# Patient Record
Sex: FEMALE | Race: BLACK OR AFRICAN AMERICAN | NOT HISPANIC OR LATINO | Employment: FULL TIME | URBAN - METROPOLITAN AREA
[De-identification: names, ages, dates, MRNs, and addresses within clinical notes are randomized per-mention and may not be internally consistent; named-entity substitution may affect disease eponyms.]

---
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
## 2021-03-04 ENCOUNTER — HOSPITAL ENCOUNTER (EMERGENCY)
Facility: HOSPITAL | Age: 21
Discharge: HOME | End: 2021-03-04
Attending: EMERGENCY MEDICINE | Admitting: EMERGENCY MEDICINE
Payer: COMMERCIAL

## 2021-03-04 VITALS
SYSTOLIC BLOOD PRESSURE: 107 MMHG | HEART RATE: 67 BPM | WEIGHT: 121.03 LBS | OXYGEN SATURATION: 100 % | DIASTOLIC BLOOD PRESSURE: 68 MMHG | TEMPERATURE: 98.8 F | RESPIRATION RATE: 16 BRPM

## 2021-03-04 DIAGNOSIS — O21.9 NAUSEA AND VOMITING IN PREGNANCY PRIOR TO 22 WEEKS GESTATION: Primary | ICD-10-CM

## 2021-03-04 DIAGNOSIS — E86.0 DEHYDRATION: ICD-10-CM

## 2021-03-04 LAB
BACTERIA UR QL AUTO: ABNORMAL /HPF
BILIRUB UR QL STRIP: ABNORMAL
CLARITY UR: CLEAR
COLOR UR: YELLOW
COLOR, POC: NORMAL
EXT PREG TEST URINE: POSITIVE
EXT. CONTROL ED NAV: ABNORMAL
GLUCOSE UR STRIP-MCNC: NEGATIVE MG/DL
HGB UR QL STRIP.AUTO: NEGATIVE
KETONES UR STRIP-MCNC: ABNORMAL MG/DL
LEUKOCYTE ESTERASE UR QL STRIP: ABNORMAL
MUCOUS THREADS UR QL AUTO: ABNORMAL
NITRITE UR QL STRIP: NEGATIVE
NON-SQ EPI CELLS URNS QL MICRO: ABNORMAL /HPF
OTHER STN SPEC: ABNORMAL
PH UR STRIP.AUTO: 6 [PH] (ref 4.5–8)
PROT UR STRIP-MCNC: ABNORMAL MG/DL
RBC #/AREA URNS AUTO: ABNORMAL /HPF
SP GR UR STRIP.AUTO: >=1.03 (ref 1–1.03)
UROBILINOGEN UR QL STRIP.AUTO: 1 E.U./DL
WBC #/AREA URNS AUTO: ABNORMAL /HPF

## 2021-03-04 PROCEDURE — 99284 EMERGENCY DEPT VISIT MOD MDM: CPT | Performed by: EMERGENCY MEDICINE

## 2021-03-04 PROCEDURE — 81001 URINALYSIS AUTO W/SCOPE: CPT

## 2021-03-04 PROCEDURE — 96374 THER/PROPH/DIAG INJ IV PUSH: CPT

## 2021-03-04 PROCEDURE — 96361 HYDRATE IV INFUSION ADD-ON: CPT

## 2021-03-04 PROCEDURE — 99284 EMERGENCY DEPT VISIT MOD MDM: CPT

## 2021-03-04 PROCEDURE — 96375 TX/PRO/DX INJ NEW DRUG ADDON: CPT

## 2021-03-04 PROCEDURE — 81025 URINE PREGNANCY TEST: CPT | Performed by: EMERGENCY MEDICINE

## 2021-03-04 RX ORDER — METOCLOPRAMIDE HYDROCHLORIDE 5 MG/ML
10 INJECTION INTRAMUSCULAR; INTRAVENOUS ONCE
Status: COMPLETED | OUTPATIENT
Start: 2021-03-04 | End: 2021-03-04

## 2021-03-04 RX ORDER — METOCLOPRAMIDE 10 MG/1
10 TABLET ORAL EVERY 6 HOURS PRN
Qty: 30 TABLET | Refills: 0 | Status: SHIPPED | OUTPATIENT
Start: 2021-03-04 | End: 2021-09-22

## 2021-03-04 RX ORDER — DIPHENHYDRAMINE HYDROCHLORIDE 50 MG/ML
25 INJECTION INTRAMUSCULAR; INTRAVENOUS ONCE
Status: COMPLETED | OUTPATIENT
Start: 2021-03-04 | End: 2021-03-04

## 2021-03-04 RX ADMIN — METOCLOPRAMIDE 10 MG: 5 INJECTION, SOLUTION INTRAMUSCULAR; INTRAVENOUS at 15:52

## 2021-03-04 RX ADMIN — SODIUM CHLORIDE 2000 ML: 0.9 INJECTION, SOLUTION INTRAVENOUS at 15:53

## 2021-03-04 RX ADMIN — DIPHENHYDRAMINE HYDROCHLORIDE 25 MG: 50 INJECTION, SOLUTION INTRAMUSCULAR; INTRAVENOUS at 15:52

## 2021-03-04 NOTE — Clinical Note
Som Stevens was seen and treated in our emergency department on 3/4/2021  Diagnosis:     Anne Marie Uriostegui  may return to work on return date  She may return on this date: 03/06/2021         If you have any questions or concerns, please don't hesitate to call        Navid Camara MD    ______________________________           _______________          _______________  Hospital Representative                              Date                                Time

## 2021-03-04 NOTE — ED PROVIDER NOTES
History  Chief Complaint   Patient presents with    Morning Sickness     Pt reports nausea and vomiting x 2 days  Pt reports positive home pregancy test 3 days ago  Only tolerating water   F LNMP end of january presents for evaluation of nausea and vomiting x 2 days  Pt complains of gradual onset of nausea yesterday which is constant unchanged and worse with eating/drinking   +multiple episodes of nbnb vomitus  No f/c, abd pain, back/flank pain, vaginal complaints, anorexia      History provided by:  Patient      None       History reviewed  No pertinent past medical history  Past Surgical History:   Procedure Laterality Date    NO PAST SURGERIES         History reviewed  No pertinent family history  I have reviewed and agree with the history as documented  E-Cigarette/Vaping    E-Cigarette Use Never User      E-Cigarette/Vaping Substances     Social History     Tobacco Use    Smoking status: Never Smoker    Smokeless tobacco: Never Used   Substance Use Topics    Alcohol use: No    Drug use: Yes     Types: Marijuana     Comment: Not since finding out she was pregnant       Review of Systems   Constitutional: Negative for appetite change, diaphoresis, fatigue and fever  HENT: Negative for congestion, dental problem and rhinorrhea  Eyes: Negative for pain  Respiratory: Negative for chest tightness and shortness of breath  Cardiovascular: Negative for chest pain and leg swelling  Gastrointestinal: Positive for nausea and vomiting  Negative for abdominal pain, blood in stool, constipation and diarrhea  Endocrine: Negative for polydipsia, polyphagia and polyuria  Genitourinary: Negative for decreased urine volume, difficulty urinating, dyspareunia, dysuria, enuresis, flank pain, frequency, hematuria, pelvic pain, vaginal bleeding, vaginal discharge and vaginal pain  Musculoskeletal: Negative for arthralgias, back pain and myalgias  Skin: Negative for color change and rash  Neurological: Negative for dizziness, weakness, light-headedness and headaches  Psychiatric/Behavioral: Negative for hallucinations and suicidal ideas  Physical Exam  Physical Exam  Constitutional:       Appearance: She is well-developed  HENT:      Head: Normocephalic and atraumatic  Eyes:      Pupils: Pupils are equal, round, and reactive to light  Neck:      Vascular: No JVD  Trachea: No tracheal deviation  Cardiovascular:      Rate and Rhythm: Normal rate and regular rhythm  Pulmonary:      Effort: No tachypnea, accessory muscle usage or respiratory distress  Abdominal:      General: There is no distension  Palpations: Abdomen is soft  Tenderness: There is no abdominal tenderness  Musculoskeletal:      Right lower leg: Normal       Left lower leg: Normal    Skin:     General: Skin is warm  Capillary Refill: Capillary refill takes less than 2 seconds  Neurological:      Mental Status: She is alert and oriented to person, place, and time     Psychiatric:         Behavior: Behavior normal          Vital Signs  ED Triage Vitals   Temperature Pulse Respirations Blood Pressure SpO2   03/04/21 1439 03/04/21 1436 03/04/21 1436 03/04/21 1439 03/04/21 1436   98 8 °F (37 1 °C) 64 16 117/77 100 %      Temp src Heart Rate Source Patient Position - Orthostatic VS BP Location FiO2 (%)   -- 03/04/21 1436 03/04/21 1436 03/04/21 1436 --    Monitor Sitting Right arm       Pain Score       --                  Vitals:    03/04/21 1436 03/04/21 1439 03/04/21 1640   BP:  117/77 107/68   Pulse: 64  67   Patient Position - Orthostatic VS: Sitting  Lying         Visual Acuity      ED Medications  Medications   sodium chloride 0 9 % bolus 2,000 mL (2,000 mL Intravenous New Bag 3/4/21 1553)   metoclopramide (REGLAN) injection 10 mg (10 mg Intravenous Given 3/4/21 1552)   diphenhydrAMINE (BENADRYL) injection 25 mg (25 mg Intravenous Given 3/4/21 1552)       Diagnostic Studies  Results Reviewed Procedure Component Value Units Date/Time    Urine Microscopic [[id number]]  (Abnormal) Collected: 03/04/21 1529    Lab Status: Final result Specimen: Urine, Clean Catch Updated: 03/04/21 1556     RBC, UA 0-1 /hpf      WBC, UA 2-4 /hpf      Epithelial Cells Occasional /hpf      Bacteria, UA Innumerable /hpf      OTHER OBSERVATIONS Trichomonas Organisms Present     MUCUS THREADS Innumerable    POCT urinalysis dipstick [[id number]]  (Normal) Resulted: 03/04/21 1532    Lab Status: Final result Specimen: Urine Updated: 03/04/21 1532     Color, UA -    Urine Macroscopic, POC [[id number]]  (Abnormal) Collected: 03/04/21 1529    Lab Status: Final result Specimen: Urine Updated: 03/04/21 1531     Color, UA Yellow     Clarity, UA Clear     pH, UA 6 0     Leukocytes, UA Trace     Nitrite, UA Negative     Protein, UA 30 (1+) mg/dl      Glucose, UA Negative mg/dl      Ketones, UA 80 (3+) mg/dl      Urobilinogen, UA 1 0 E U /dl      Bilirubin, UA Interference- unable to analyze     Blood, UA Negative     Specific Gravity, UA >=1 030    Narrative:      CLINITEK RESULT    POCT pregnancy, urine [[id number]]  (Abnormal) Resulted: 03/04/21 1529    Lab Status: Final result Updated: 03/04/21 1529     EXT PREG TEST UR (Ref: Negative) positive     Control valid                 No orders to display              Procedures  CriticalCare Time  Performed by: Charlyne Ahumada, MD  Authorized by: Charlyne Ahumada, MD     Critical care provider statement:     Critical care time (minutes):  35    Critical care time was exclusive of:  Separately billable procedures and treating other patients and teaching time    Critical care was necessary to treat or prevent imminent or life-threatening deterioration of the following conditions:  Dehydration    Critical care was time spent personally by me on the following activities:  Blood draw for specimens, obtaining history from patient or surrogate, development of treatment plan with patient or surrogate, discussions with consultants, evaluation of patient's response to treatment, examination of patient, interpretation of cardiac output measurements, ordering and performing treatments and interventions, ordering and review of laboratory studies, ordering and review of radiographic studies, re-evaluation of patient's condition and review of old charts             ED Course  ED Course as of Mar 04 1646   Thu Mar 04, 2021   1642 Symptoms resolved, will dc to home w/ ob f/u                                              MDM  Number of Diagnoses or Management Options  Dehydration:   Nausea and vomiting in pregnancy prior to 22 weeks gestation:   Diagnosis management comments: N/v in pregnancy with benign exam-will do urine dip, upreg, ivfs, anti-emetics, po challenge    Ivf's given for sever dehydration      Disposition  Final diagnoses:   Nausea and vomiting in pregnancy prior to 22 weeks gestation   Dehydration     Time reflects when diagnosis was documented in both MDM as applicable and the Disposition within this note     Time User Action Codes Description Comment    3/4/2021  4:44 PM Jenniffer Lynn Add [O21 9] Nausea and vomiting in pregnancy prior to 22 weeks gestation     3/4/2021  4:44 PM Hayden Baeza Add [E86 0] Dehydration       ED Disposition     ED Disposition Condition Date/Time Comment    Discharge Stable Thu Mar 4, 2021  4:44 PM Rasta Shearer discharge to home/self care              Follow-up Information     Follow up With Specialties Details Why Contact Info Additional [address] [facility] and Gynecology Schedule an appointment as soon as possible for a visit in 2 days  [address], [address] [facility] [address]  [facility], [address], [address] [place], [place], [address] [phone]          Patient's Medications   Discharge Prescriptions    METOCLOPRAMIDE (REGLAN) 10 MG TABLET    Take 1 tablet (10 mg total) by mouth every 6 (six) hours as needed (nausea and/or vomiting)       Start Date: 3/4/2021  End Date: --       Order Dose: 10 mg       Quantity: 30 tablet    Refills: 0     No discharge procedures on file      PDMP Review     None          ED Provider  Electronically Signed by           Lisa Gusman MD  03/04/21 9613

## 2021-03-20 ENCOUNTER — HOSPITAL ENCOUNTER (EMERGENCY)
Facility: HOSPITAL | Age: 21
Discharge: HOME | End: 2021-03-20
Attending: EMERGENCY MEDICINE | Admitting: EMERGENCY MEDICINE
Payer: COMMERCIAL

## 2021-03-20 VITALS
WEIGHT: 123.46 LBS | SYSTOLIC BLOOD PRESSURE: 116 MMHG | HEART RATE: 73 BPM | OXYGEN SATURATION: 100 % | RESPIRATION RATE: 14 BRPM | TEMPERATURE: 98.3 F | DIASTOLIC BLOOD PRESSURE: 73 MMHG

## 2021-03-20 DIAGNOSIS — N39.0 UTI (URINARY TRACT INFECTION): Primary | ICD-10-CM

## 2021-03-20 LAB
BACTERIA UR QL AUTO: ABNORMAL /HPF
BILIRUB UR QL STRIP: NEGATIVE
CLARITY UR: CLEAR
COLOR UR: YELLOW
COLOR, POC: YELLOW
EXT PREG TEST URINE: ABNORMAL
EXT. CONTROL ED NAV: ABNORMAL
GLUCOSE UR STRIP-MCNC: NEGATIVE MG/DL
HGB UR QL STRIP.AUTO: ABNORMAL
KETONES UR STRIP-MCNC: NEGATIVE MG/DL
LEUKOCYTE ESTERASE UR QL STRIP: ABNORMAL
NITRITE UR QL STRIP: NEGATIVE
NON-SQ EPI CELLS URNS QL MICRO: ABNORMAL /HPF
PH UR STRIP.AUTO: 5.5 [PH] (ref 4.5–8)
PROT UR STRIP-MCNC: ABNORMAL MG/DL
RBC #/AREA URNS AUTO: ABNORMAL /HPF
SP GR UR STRIP.AUTO: >=1.03 (ref 1–1.03)
UROBILINOGEN UR QL STRIP.AUTO: 0.2 E.U./DL
WBC #/AREA URNS AUTO: ABNORMAL /HPF

## 2021-03-20 PROCEDURE — 81001 URINALYSIS AUTO W/SCOPE: CPT

## 2021-03-20 PROCEDURE — 87491 CHLMYD TRACH DNA AMP PROBE: CPT | Performed by: EMERGENCY MEDICINE

## 2021-03-20 PROCEDURE — 87591 N.GONORRHOEAE DNA AMP PROB: CPT | Performed by: EMERGENCY MEDICINE

## 2021-03-20 PROCEDURE — 81025 URINE PREGNANCY TEST: CPT | Performed by: EMERGENCY MEDICINE

## 2021-03-20 PROCEDURE — 87086 URINE CULTURE/COLONY COUNT: CPT

## 2021-03-20 PROCEDURE — 87077 CULTURE AEROBIC IDENTIFY: CPT

## 2021-03-20 PROCEDURE — 99284 EMERGENCY DEPT VISIT MOD MDM: CPT | Performed by: EMERGENCY MEDICINE

## 2021-03-20 PROCEDURE — 99283 EMERGENCY DEPT VISIT LOW MDM: CPT

## 2021-03-20 RX ORDER — NITROFURANTOIN 25; 75 MG/1; MG/1
100 CAPSULE ORAL 2 TIMES DAILY
Qty: 10 CAPSULE | Refills: 0 | Status: SHIPPED | OUTPATIENT
Start: 2021-03-20 | End: 2021-03-20 | Stop reason: SDUPTHER

## 2021-03-20 RX ORDER — NITROFURANTOIN 25; 75 MG/1; MG/1
100 CAPSULE ORAL 2 TIMES DAILY
Qty: 10 CAPSULE | Refills: 0 | Status: SHIPPED | OUTPATIENT
Start: 2021-03-20 | End: 2021-03-25

## 2021-03-20 NOTE — ED PROVIDER NOTES
History  Chief Complaint   Patient presents with    Possible UTI     hematuria, urgency and frequency       Urinary Frequency  Severity:  Mild  Onset quality:  Gradual  Duration:  4 days  Timing:  Intermittent  Progression:  Waxing and waning  Chronicity:  New  Context:  Urinary frequency/urgency  Associated symptoms: no abdominal pain, no chest pain, no cough, no fever, no nausea, no shortness of breath and no vomiting        Prior to Admission Medications   Prescriptions Last Dose Informant Patient Reported? Taking?   metoclopramide (REGLAN) 10 mg tablet   No No   Sig: Take 1 tablet (10 mg total) by mouth every 6 (six) hours as needed (nausea and/or vomiting)      Facility-Administered Medications: None       History reviewed  No pertinent past medical history  Past Surgical History:   Procedure Laterality Date    NO PAST SURGERIES         History reviewed  No pertinent family history  I have reviewed and agree with the history as documented  E-Cigarette/Vaping    E-Cigarette Use Never User      E-Cigarette/Vaping Substances     Social History     Tobacco Use    Smoking status: Never Smoker    Smokeless tobacco: Never Used   Substance Use Topics    Alcohol use: No    Drug use: Yes     Types: Marijuana     Comment: Not since finding out she was pregnant        Review of Systems   Constitutional: Negative for chills and fever  Respiratory: Negative for cough and shortness of breath  Cardiovascular: Negative for chest pain  Gastrointestinal: Negative for abdominal pain, nausea and vomiting  Genitourinary: Positive for dysuria and frequency  Negative for flank pain, vaginal bleeding and vaginal discharge  Musculoskeletal: Negative for back pain  Skin: Negative for wound  Neurological: Negative for weakness and numbness  All other systems reviewed and are negative        Physical Exam  ED Triage Vitals [03/20/21 1839]   Temperature Pulse Respirations Blood Pressure SpO2   98 3 °F (36 8 °C) 73 14 116/73 100 %      Temp Source Heart Rate Source Patient Position - Orthostatic VS BP Location FiO2 (%)   Oral Monitor Sitting Right arm --      Pain Score       --             Orthostatic Vital Signs  Vitals:    03/20/21 1839   BP: 116/73   Pulse: 73   Patient Position - Orthostatic VS: Sitting       Physical Exam  Vitals signs and nursing note reviewed  Constitutional:       General: She is not in acute distress  Appearance: She is well-developed  She is not diaphoretic  HENT:      Head: Normocephalic  Right Ear: External ear normal       Left Ear: External ear normal       Nose: Nose normal    Eyes:      Conjunctiva/sclera: Conjunctivae normal    Neck:      Musculoskeletal: Normal range of motion  Trachea: No tracheal deviation  Cardiovascular:      Rate and Rhythm: Normal rate  Pulmonary:      Effort: Pulmonary effort is normal  No respiratory distress  Abdominal:      General: There is no distension  Palpations: Abdomen is soft  Tenderness: There is no abdominal tenderness  There is no right CVA tenderness, left CVA tenderness or guarding  Musculoskeletal:         General: No deformity  Skin:     General: Skin is warm and dry  Neurological:      General: No focal deficit present  Mental Status: She is alert     Psychiatric:         Behavior: Behavior normal          ED Medications  Medications - No data to display    Diagnostic Studies  Results Reviewed     Procedure Component Value Units Date/Time    Urine Microscopic [[id number]]  (Abnormal) Collected: 03/20/21 1925    Lab Status: Final result Specimen: Urine, Clean Catch Updated: 03/20/21 1945     RBC, UA Innumerable /hpf      WBC, UA Innumerable /hpf      Epithelial Cells Occasional /hpf      Bacteria, UA Innumerable /hpf     POCT urinalysis dipstick [[id number]]  (Normal) Resulted: 03/20/21 1932    Lab Status: Final result Specimen: Urine Updated: 03/20/21 1932     Color, UA yellow    POCT pregnancy, urine [[id number]]  (Abnormal) Resulted: 03/20/21 1932    Lab Status: Final result Updated: 03/20/21 1932     EXT PREG TEST UR (Ref: Negative) Positive (+)     Control Valid    Chlamydia/GC amplified DNA by PCR [[id number]] Collected: 03/20/21 1927    Lab Status: In process Specimen: Urine, Other Updated: 03/20/21 1930    Urine culture [[id number]] Collected: 03/20/21 1925    Lab Status: In process Specimen: Urine, Clean Catch Updated: 03/20/21 1930    Urine Macroscopic, POC [[id number]]  (Abnormal) Collected: 03/20/21 1925    Lab Status: Final result Specimen: Urine Updated: 03/20/21 1926     Color, UA Yellow     Clarity, UA Clear     pH, UA 5 5     Leukocytes, UA Small     Nitrite, UA Negative     Protein,  (2+) mg/dl      Glucose, UA Negative mg/dl      Ketones, UA Negative mg/dl      Urobilinogen, UA 0 2 E U /dl      Bilirubin, UA Negative     Blood, UA Large     Specific Gravity, UA >=1 030    Narrative:      CLINITEK RESULT                 No orders to display         Procedures  Procedures      ED Course  ED Course as of Mar 20 2011   Sat Mar 20, 2021   1934 PREGNANCY TEST URINE: Positive (+)   1934 Leukocytes, UA(!): Small   1934 Blood, UA(!): Large                                       MDM  Number of Diagnoses or Management Options  Diagnosis management comments: This is a 27-year-old female who presents for evaluation of urinary frequency  Patient reports that she has been experiencing 4 days of urinary frequency and urgency and dysuria , gradual onset with no specific trigger  No fevers or chills or systemic symptoms, denies vaginal bleeding or discharge  No back pain or flank pain  Denies abdominal pain  Reports she has had a positive home pregnancy test, reports her last period was in January  She states that she has follow-up with OB this coming week to establish care  Denies other medical problems, states taking prenatals, denies taking other medications, no medication allergies    No history of kidney stones  Patient on exam is well-appearing with stable vital signs, afebrile, she has a benign abdominal exam, physical exam is otherwise unremarkable  Overall her symptoms are most consistent with likely UTI  No other symptoms to suggest other pregnancy complication  Will obtain urine dip, urine hCG, will also send a GC chlamydia screening  Plan to have patient follow-up with OB  Amount and/or Complexity of Data Reviewed  Clinical lab tests: ordered and reviewed        Disposition  Final diagnoses:   UTI (urinary tract infection)     Time reflects when diagnosis was documented in both MDM as applicable and the Disposition within this note     Time User Action Codes Description Comment    3/20/2021  7:40 PM Joycelyn aPtel Add [N39 0] UTI (urinary tract infection)       ED Disposition     ED Disposition Condition Date/Time Comment    Discharge Stable Sat Mar 20, 2021  7:40 PM Alamo Bun discharge to home/self care              Follow-up Information     Follow up With Specialties Details Why Contact Info Additional [address] [facility] Obstetrics and Gynecology Go on 3/23/2021 Please follow up with your scheduled OB appointment [address], [address] [facility] [id number] [place] [address], [address], [address] [place] [address], [address] [address] [place] [address] Emergency Department Emergency Medicine Go to  If symptoms worsen [facility] [id number]  [address] [place] [address] Emergency Department, [address] [facility] , [place], [place], [address]          Discharge Medication List as of 3/20/2021  7:42 PM      START taking these medications    Details   nitrofurantoin (MACROBID) 100 mg capsule Take 1 capsule (100 mg total) by mouth 2 (two) times a day for 5 days, Starting Sat 3/20/2021, Until Thu 3/25/2021, Normal         CONTINUE these medications which have NOT CHANGED    Details   metoclopramide (REGLAN) 10 mg tablet Take 1 tablet (10 mg total) by mouth every 6 (six) hours as needed (nausea and/or vomiting), Starting Thu 3/4/2021, Normal           No discharge procedures on file  PDMP Review     None           ED Provider  Attending physically available and evaluated Jamar Robertson  SARINA managed the patient along with the ED Attending      Electronically Signed by         Geoff Holliday MD  03/20/21 2011

## 2021-03-20 NOTE — ED ATTENDING ATTESTATION
3/20/2021  IAdina MD, saw and evaluated the patient  I have discussed the patient with the resident/non-physician practitioner and agree with the resident's/non-physician practitioner's findings, Plan of Care, and MDM as documented in the resident's/non-physician practitioner's note, except where noted  All available labs and Radiology studies were reviewed  I was present for key portions of any procedure(s) performed by the resident/non-physician practitioner and I was immediately available to provide assistance  At this point I agree with the current assessment done in the Emergency Department  I have conducted an independent evaluation of this patient a history and physical is as follows:  22 y/o F lnmp 8 weeks ago presents for evaluation of several days of dysuria, urgency, frequency and hematuria  Denies vaginal bleeding d/c, pelvic/abd/back pain, n/v/f/c   10 systems reviewed and otherwise neg  On exam no distress, lungs nml, cardiac nml, abd nml no cvat   MDM: uti symtpoms with a bening exam-will do urine dip    Pregnancy w/o hx/exam findings to suggest pregnancy complication-will reassure, , pcp f/u  ED Course  ED Course as of Mar 20 1951   Sat Mar 20, 2021   1947 WBC, UA(!): Innumerable         Critical Care Time  Procedures

## 2021-03-20 NOTE — ED NOTES
Pt provided with water to encourage urine specimen at this time        Amauri Castro RN  03/20/21 2355

## 2021-03-22 LAB
BACTERIA UR CULT: ABNORMAL
C TRACH DNA SPEC QL NAA+PROBE: POSITIVE
N GONORRHOEA DNA SPEC QL NAA+PROBE: NEGATIVE

## 2021-03-23 ENCOUNTER — DOCUMENTATION (OUTPATIENT)
Dept: OBGYN CLINIC | Facility: CLINIC | Age: 21
End: 2021-03-23

## 2021-03-23 ENCOUNTER — ULTRASOUND (OUTPATIENT)
Dept: OBGYN CLINIC | Facility: CLINIC | Age: 21
End: 2021-03-23

## 2021-03-23 ENCOUNTER — APPOINTMENT (OUTPATIENT)
Dept: LAB | Facility: CLINIC | Age: 21
End: 2021-03-23
Payer: COMMERCIAL

## 2021-03-23 VITALS — HEART RATE: 56 BPM | SYSTOLIC BLOOD PRESSURE: 114 MMHG | DIASTOLIC BLOOD PRESSURE: 74 MMHG | WEIGHT: 121 LBS

## 2021-03-23 DIAGNOSIS — Z3A.09 9 WEEKS GESTATION OF PREGNANCY: Primary | ICD-10-CM

## 2021-03-23 DIAGNOSIS — Z3A.09 9 WEEKS GESTATION OF PREGNANCY: ICD-10-CM

## 2021-03-23 LAB
ABO GROUP BLD: NORMAL
BACTERIA UR QL AUTO: ABNORMAL /HPF
BASOPHILS # BLD AUTO: 0.04 THOUSANDS/ΜL (ref 0–0.1)
BASOPHILS NFR BLD AUTO: 1 % (ref 0–1)
BILIRUB UR QL STRIP: NEGATIVE
BLD GP AB SCN SERPL QL: NEGATIVE
CLARITY UR: ABNORMAL
COLOR UR: YELLOW
EOSINOPHIL # BLD AUTO: 0.04 THOUSAND/ΜL (ref 0–0.61)
EOSINOPHIL NFR BLD AUTO: 1 % (ref 0–6)
ERYTHROCYTE [DISTWIDTH] IN BLOOD BY AUTOMATED COUNT: 12.4 % (ref 11.6–15.1)
GLUCOSE UR STRIP-MCNC: NEGATIVE MG/DL
HBV SURFACE AG SER QL: NORMAL
HCT VFR BLD AUTO: 37.6 % (ref 34.8–46.1)
HGB BLD-MCNC: 12.3 G/DL (ref 11.5–15.4)
HGB UR QL STRIP.AUTO: ABNORMAL
IMM GRANULOCYTES # BLD AUTO: 0.03 THOUSAND/UL (ref 0–0.2)
IMM GRANULOCYTES NFR BLD AUTO: 0 % (ref 0–2)
KETONES UR STRIP-MCNC: NEGATIVE MG/DL
LEUKOCYTE ESTERASE UR QL STRIP: ABNORMAL
LYMPHOCYTES # BLD AUTO: 1.68 THOUSANDS/ΜL (ref 0.6–4.47)
LYMPHOCYTES NFR BLD AUTO: 20 % (ref 14–44)
MCH RBC QN AUTO: 29.4 PG (ref 26.8–34.3)
MCHC RBC AUTO-ENTMCNC: 32.7 G/DL (ref 31.4–37.4)
MCV RBC AUTO: 90 FL (ref 82–98)
MONOCYTES # BLD AUTO: 0.6 THOUSAND/ΜL (ref 0.17–1.22)
MONOCYTES NFR BLD AUTO: 7 % (ref 4–12)
MUCOUS THREADS UR QL AUTO: ABNORMAL
NEUTROPHILS # BLD AUTO: 5.88 THOUSANDS/ΜL (ref 1.85–7.62)
NEUTS SEG NFR BLD AUTO: 71 % (ref 43–75)
NITRITE UR QL STRIP: NEGATIVE
NON-SQ EPI CELLS URNS QL MICRO: ABNORMAL /HPF
NRBC BLD AUTO-RTO: 0 /100 WBCS
PH UR STRIP.AUTO: 6.5 [PH]
PLATELET # BLD AUTO: 259 THOUSANDS/UL (ref 149–390)
PMV BLD AUTO: 12.1 FL (ref 8.9–12.7)
PROT UR STRIP-MCNC: ABNORMAL MG/DL
RBC # BLD AUTO: 4.19 MILLION/UL (ref 3.81–5.12)
RBC #/AREA URNS AUTO: ABNORMAL /HPF
RH BLD: POSITIVE
RUBV IGG SERPL IA-ACNC: 68.3 IU/ML
SP GR UR STRIP.AUTO: 1.02 (ref 1–1.03)
SPECIMEN EXPIRATION DATE: NORMAL
UROBILINOGEN UR QL STRIP.AUTO: 1 E.U./DL
WBC # BLD AUTO: 8.27 THOUSAND/UL (ref 4.31–10.16)
WBC #/AREA URNS AUTO: ABNORMAL /HPF

## 2021-03-23 PROCEDURE — 81220 CFTR GENE COM VARIANTS: CPT

## 2021-03-23 PROCEDURE — 83020 HEMOGLOBIN ELECTROPHORESIS: CPT

## 2021-03-23 PROCEDURE — 36415 COLL VENOUS BLD VENIPUNCTURE: CPT

## 2021-03-23 PROCEDURE — 81401 MOPATH PROCEDURE LEVEL 2: CPT

## 2021-03-23 PROCEDURE — 81243 FMR1 GEN ALY DETC ABNL ALLEL: CPT

## 2021-03-23 PROCEDURE — 80081 OBSTETRIC PANEL INC HIV TSTG: CPT

## 2021-03-23 PROCEDURE — 87086 URINE CULTURE/COLONY COUNT: CPT

## 2021-03-23 PROCEDURE — 81001 URINALYSIS AUTO W/SCOPE: CPT

## 2021-03-23 PROCEDURE — 82105 ALPHA-FETOPROTEIN SERUM: CPT

## 2021-03-23 PROCEDURE — 99213 OFFICE O/P EST LOW 20 MIN: CPT | Performed by: OBSTETRICS & GYNECOLOGY

## 2021-03-23 NOTE — PROGRESS NOTES
Assessment  21 y o   at approximately 9 weeks gestation presenting for amenorrhea  Pregnancy confirmed, dating consistent with LMP  COREEN 10/23/21  Final dating by LMP of 2021  Plan  Problem List Items Addressed This Visit     None      Visit Diagnoses     9 weeks gestation of pregnancy    -  Primary    Relevant Orders    Prenatal Panel    Prenatal Carrier Screen (CF, Fragile X, SMA)    Hemoglobin Electrophoresis    Alpha fetoprotein, maternal        - Prenatal vitamin  - Prenatal panel (slips given today)  - Discussed genetic screening  - Prenatal Nursing Intake in 2 weeks  - Prenatal H&P in 4 weeks     ____________________________________________________________      Subjective   Ed Gemma is a 21 y o   with an LMP of Patient's last menstrual period was 2021  (9w3d by LMP) who presents for amenorrhea  She notes she took a home pregnancy test on 3/1/2021 and it was positive  She is currently otherwise without complaint  Patient notes that this pregnancy was unplanned  She was not using contraception at the time  She reports she is certain of her LMP and that she has regular menses  She has has no vaginal bleeding since her LMP        Objective  LMP 2021     Physical Exam:  Physical Exam    Transvaginal Pelvic Ultrasound  García IUP  CRL 2 44-2 47cm, consistent with EGA 9s1d  +yolk sac  +cardiac activity    No adnexal masses appreciated

## 2021-03-24 LAB
BACTERIA UR CULT: NORMAL
HIV 1+2 AB+HIV1 P24 AG SERPL QL IA: NORMAL
RPR SER QL: NORMAL

## 2021-03-25 LAB
HGB A MFR BLD: 2.7 % (ref 1.8–3.2)
HGB A MFR BLD: 96.9 % (ref 96.4–98.8)
HGB F MFR BLD: 0.4 % (ref 0–2)
HGB FRACT BLD-IMP: NORMAL
HGB S MFR BLD: 0 %

## 2021-03-26 LAB
2ND TRIMESTER 4 SCREEN SERPL-IMP: NORMAL
AFP ADJ MOM SERPL: NORMAL
AFP INTERP AMN-IMP: NORMAL
AFP INTERP SERPL-IMP: NORMAL
AFP INTERP SERPL-IMP: NORMAL
AFP SERPL-MCNC: 4 NG/ML
AGE AT DELIVERY: 20.9 YR
GA METHOD: NORMAL
GA: 9.4 WEEKS
IDDM PATIENT QL: NO
MULTIPLE PREGNANCY: NO
NEURAL TUBE DEFECT RISK FETUS: NORMAL %

## 2021-03-29 ENCOUNTER — TELEMEDICINE (OUTPATIENT)
Dept: OBGYN CLINIC | Facility: CLINIC | Age: 21
End: 2021-03-29

## 2021-03-29 VITALS — HEIGHT: 63 IN | BODY MASS INDEX: 21.44 KG/M2 | WEIGHT: 121 LBS

## 2021-03-29 DIAGNOSIS — A74.9 CHLAMYDIA: Primary | ICD-10-CM

## 2021-03-29 DIAGNOSIS — Z3A.10 10 WEEKS GESTATION OF PREGNANCY: Primary | ICD-10-CM

## 2021-03-29 PROCEDURE — 99211 OFF/OP EST MAY X REQ PHY/QHP: CPT

## 2021-03-29 RX ORDER — AZITHROMYCIN 500 MG/1
1000 TABLET, FILM COATED ORAL ONCE
Qty: 2 TABLET | Refills: 0 | Status: SHIPPED | OUTPATIENT
Start: 2021-03-29 | End: 2021-03-29

## 2021-03-29 NOTE — PROGRESS NOTES
Virtual Regular Visit      Assessment/Plan:    Problem List Items Addressed This Visit     None      Visit Diagnoses     10 weeks gestation of pregnancy    -  Primary    Relevant Orders    Hepatitis C antibody    Hemoglobin Electrophoresis    Varicella zoster antibody, IgG    Ambulatory Referral to Maternal Fetal Medicine    Ambulatory Referral to [facility]               Reason for visit is   Chief Complaint   Patient presents with    Initial Prenatal Visit        Encounter provider Latha Costello RN    Provider located at [address] [place] [id number] [phone]      Recent Visits  No visits were found meeting these conditions  Showing recent visits within past 7 days and meeting all other requirements     Today's Visits  Date Type Provider Dept   21 Telemedicine Latha Costello, [address] today's visits and meeting all other requirements     Future Appointments  No visits were found meeting these conditions  Showing future appointments within next 150 days and meeting all other requirements        The patient was identified by name and date of birth  Emily Head was informed that this is a telemedicine visit and that the visit is being conducted through [employer] and patient was informed that this is a secure, HIPAA-compliant platform  She agrees to proceed     My office door was closed  No one else was in the room  She acknowledged consent and understanding of privacy and security of the video platform  The patient has agreed to participate and understands they can discontinue the visit at any time  Patient is aware this is a billable service  OB INTAKE INTERVIEW  Pt presents for OB intake       OB History    Para Term  AB Living   1             SAB TAB Ectopic Multiple Live Births                  # Outcome Date GA Lbr Edison/2nd Weight Sex Delivery Anes PTL Lv 1 Current                Last Menstrual Period:    Patient's last menstrual period was 01/16/2021 (exact date)  Ultrasound date: 3/23/21 9 weeks 1 days     Estimated Date of Delivery: 10/23/21    Current Issues:  Constipation :   No  Headaches :   No  Cramping:  No      Interview education   [facility] Pregnancy Essentials reviewed and discussed    Baby and Me [address] Handout   [facility] MFM Handout   Discussed genetic testing   Prenatal lab work: Scripts printed and given to pt   Influenza vaccine given today: No   Discussed Tdap vaccine  Immunizations:   Immunization History   Administered Date(s) Administered    DTaP 5 01/17/2001, 04/03/2001, 06/07/2002, 11/19/2002, 02/24/2005    HPV Quadrivalent 10/02/2002, 12/05/2011, 07/31/2014    Hep B, adult 01/17/2001, 04/13/2001, 11/19/2002    Hib (PRP-OMP) 01/17/2001, 04/13/2001, 06/07/2002, 11/19/2002    IPV 01/17/2001, 04/03/2001, 06/07/2002, 02/24/2005    MMR 06/07/2002, 02/24/2005    Meningococcal, Unknown Serogroups 12/05/2011, 02/02/2017     Depression Screening Follow-up Plan: Patient's depression screening was negative with an Burnett score of  0      Prenatal panel already completed  Scripts given for Hep c, hemoglobin electrophoresis and Varicella  Referral placed for Level 2 U/S, scheduled for 6/9/21 at [address] [place] [address] at the [facility]  Patient in the process of getting insurance set up  She is going to call today to verify the status  She would like to hold off on the Sequential Screen for now   I reviewed the Quad Screen with her and she is interested in that testing when her insurance is active  Referral placed for [facility]      Interviewed by: Radha Baez RN 03/29/21        I spent 50 minutes with patient today in which greater than 50% of the time was spent in counseling/coordination of care regarding her pregnancy      VIRTUAL VISIT DISCLAIMER    Jarrod Yun acknowledges that she has consented to an online visit or consultation  She understands that the online visit is based solely on information provided by her, and that, in the absence of a face-to-face physical evaluation by the physician, the diagnosis she receives is both limited and provisional in terms of accuracy and completeness  This is not intended to replace a full medical face-to-face evaluation by the physician  Callie Whyte understands and accepts these terms

## 2021-03-29 NOTE — PATIENT INSTRUCTIONS
The First Trimester  (up through 14 weeks)   YOUR BABY   · Your baby starts to develop when your egg and a sperm come together  · Your baby grows inside your uterus (also called your womb)  It floats inside a bag of water - called the amniotic sac  The baby is connected to you by an umbilical cord which goes from the babys belly to the placenta  The placenta is attached to your uterus, and the placenta is where blood, oxygen, and food cross over from you to your baby  · Unhealthy things like drugs, alcohol, and tobacco can also cross over from you to your baby through the placenta  It is important to avoid these things as they can be harmful to how your baby develops and grows  · By the end of the first month, your babys heart is beating  The baby is about the size of a piece of rice  · By the end of the second month, all of you babys organ (heart, lungs, brain, skull) have completely formed  Now they just have to continue maturing and growing  The baby is about the size of a grape  · By the end of the third month, your baby is about 4 inches long! YOUR BODY   · Your period stops - this might be the first sign that you have that you are pregnant   · Your breasts may become sore and tender  · You may feel very tired  · You may have an upset stomach with nausea and/or vomiting which can occur at any time of day - or sometimes all day long  · You may feel lujan and cranky  You may also feel afraid or happy  This is all normal and natural!   · You may lose or gain weight  This is okay as well, as long as you are not losing or gaining a lot of weight          The Second Trimester (14-28 weeks)   YOUR BABY   * 4th month   · your baby has eyelashes and eyebrows   · your baby kicks, moves, and swallows   · your baby is 6 to 7 inches long   * 5th month   · your baby has fingernails   · your baby starts to having sleeping and awake cycles   · baby becomes very active (even though you may not always feel it)   · your baby is 8 to 12 inches long and weighs anywhere from ½ to 1 pound   * 6th month   · your babys eyes are almost completely formed and they will soon open and close   · babys skin is red and wrinkly and covered with a fine, soft hair called lanugo   · baby continues to be very active and you should be feeling it very well and very often   · your baby is 11 to 14 inches long     YOUR BODY   · Morning sickness usually starts to go away and women generally start to gaining weight more quickly  · You may start to get stretch marks on your belly and/or breasts which can be itchy  Softly rub lotion onto them to help relieve the itching  · Your vaginal discharge may become whitish in color  · You may become constipated  Try increasing fiber in your diet, or you may take a stool softener pill (such as Colace) to relieve the discomfort  · You may start to have heartburn  Medications like Tums or Maalox may help to relieve this  Try staying in a sitting position for at least an hour after meals to decrease heartburn  · You should try to get 8 hours of sleep at night, plus a nap during the day if possible  · You should not sit for longer than 2 hours without taking a break to stretch your legs  The Third Trimester  (28-42 weeks)  YOUR BABY   · your baby sucks its thumb now! · your baby can hear voices and respond to touch   so talk to him or her!!   · your babys brain grows and develops most in the last 2 months of pregnancy   · babys head and bones are soft and flexible so they can fit through the birth canal   · babys movements change towards the end of pregnancy because there is less room for kicking and stretching in your belly   · babys lungs are not fully developed and completely ready to breathe on their own until the last 3-4 weeks before your due date    YOUR BODY   · your belly is growing a lot now   · it may become more difficult to sleep well at night or to be as active as you usually are   · you may sweat more than usual   · you will become more off-balancebe careful not to fall!!   · you may develop hemorrhoids (which can be painful and make it difficult to sit down)   · the last two months of pregnancy can become very uncomfortablewith backaches, headaches, and heartburn   · you can start to have contractions  as long as they are irregular and less than 5 per hour, this is a normal part of your body getting ready to have a baby   · your cervix may start to thin out and open upto get ready for delivery   · you may find yourself needing to pee very often  because baby is pressing on your bladder so much   · you may get out of breathe more quickly than usual          Is my baby developing normally? GENETIC SCREENING    One of the concerns that many women have about their pregnancy is whether their baby is developing normally  There are various different tests that we can use try to help determine whether babies are developing normally or not  Some women have a higher risk for their baby to develop abnormally (sometimes called a birth defect), but it can happen to ANYONE  That is why we offer these tests to ALL women during their pregnancies  None of these tests are required  It is your choice which ones you choose to have done or choose to NOT have done during your pregnancy  Some of these tests are only available at a particular time during your pregnancy, so it is important to make decisions about what testing you desire early in the pregnancy  Here is some information about these different tests to help you make decisions about whether any of these tests are right for you       * ANATOMY ULTRASOUND: this ultrasound is done between 18 to 22 weeks of pregnancy and looks VERY closely at all of your babys parts and pieces to look for signs of any abnormal development; ultrasound is not perfect and CANNOT detect ALL types of birth defects (especially Down Syndrome) - but it is a very helpful test     * SEQUENTIAL SCREENING: this is actually a combination of tests which include an ultrasound measuring the back of your baby's neck between 11-13 weeks and bloodwork from you at that time and again between 15-22 weeks; this test can help tell us the risk for your baby to be affected by certain chromosome abnormalities or birth defects  * QUAD SCREEN: this test is done with just bloodwork from you between 15-22 weeks of pregnancy; it can help tell us the risk for your baby to be affected by certain chromosome abnormalities or birth defects  * CELL-FREE DNA SCREEN: this test is done with just bloodwork from you any time after 10 weeks of pregnancy; it is very accurate at telling us if your baby has a high chance for Down syndrome or other chromosome abnormalities but is generally reserved for women who have a high-risk factor for a baby with a chromosome abnormality  * MATERNAL SERUM ALPHA-FETO PROTEIN SCREEN:  this test is done with just bloodwork from you between 15-22 weeks of pregnancy; it helps us get an idea if your baby is developing a birth defect like spina bifida  * AMNIOCENTESIS: this test involves using a very thin needle inserted into your uterus to take out a little bit of the fluid around your baby for testing; it can be performed any time after 16 weeks of pregnancy; this test tells us for certain if your baby has particular birth defects (especially chromosome abnormalities); there is a very small risk for miscarriage to occur when you have this testing performed; this test is generally reserved for women who have a high-risk factor for a baby with an abnormality         Women who have a higher risk for babies to develop abnormally (sometimes called a birth defect) include women with the following:   · Age 28 years or older   · Obesity at the time of conception   · Diabetes at the time of conception   · A previous child with a birth defect   · Taking medications which may cause a birth defect     Here are some important questions to ask yourself when deciding which (if any) of these tests you want to have performed  · Do I want to know before birth if my baby has a birth defect? · What will I do with this information if the result shows a high chance for a birth defect? · How would learning about a birth defect help me make choices about my pregnancy? · Will these tests help me feel more reassured or just more anxious and afraid? Medications that are safe to take     Here is a list of medications which are safe for you to take during pregnancy without having to discuss with the nurse practitioner or physician:     * Tylenol/Acetaminophen (headache or fever)   * Benadryl/Diphenhydramine (allergies, runny nose, difficult sleeping, itching)   * Claritin/Loratidine (allergies, runny nose)   * Zyrtec/Cetirizine (allergies, runny nose)   * Allegra/Fexofenadine (allergies)  * Robitussin/Guaifenesin (coughing)   * Sudafed/Pseudoephedrine (runny nose/congestion)   * Colace/Docusate sodium (constipation)   * Maalox/Magnesium hydroxide (heartburn, indigestion)   * Zantac/Ranitidine (heartburn, indigestion)   * Pepcid/Famotidine (heartburn, indigestion)   * Tums/Calcium carbonate (heartburn, nausea)   * Kaopectate/Bismuth subsalicylate (diarrhea)   * Immodium/Loperamide (diarrhea)   * Vitamin B6/Pyrodoxine(nausea)   * Doxylamine/Unisom (nausea, insomnia)     Any other medications should be discussed with either our nurse practitioner or one of our physicians before taking  Nutrition in Pregnancy  Good Nutrition is a VERY important part of having a healthy pregnancy and healthy baby    You should follow a healthy diet which include the following:   · Vegetables (which are dark green and leafy): at least 2 servings each day   · Protein (meat, eggs, beans, nuts, peanut butter): 3-4 servings each day   · Breads/whole grains (bread, pasta, rice, tortillas, potatoes): 3 servings each day   · Dairy (milk, yogurt, cheese): 3-4 servings each day   · Water: 6-8 glasses per day   · Calories: approximately 2000 to 2200 calories per day     Weight Gain   Recommended weight gain for you during your pregnancy is based on your body mass index (BMI) at the time that you became pregnant  Pre-pregnant BMI Recommended weight gain   Underweight (BMI less than 18 5) 28 to 40 pounds   Normal weight (BMI 18 5-24 9) 25 to 35 pounds   Overweight (BMI 25-29 9) 15 to 25 pounds   Obese (BMI 30 or greater) 11 to 20 pounds     Food safety   It is VERY important to eat only safely-prepared foods during pregnancy as you and your baby have a higher risk than usual for being affected by foodborne illnesses  Follow these steps to ensure that you and your baby are safe from foodborne illnesses while you are pregnant:   · wash hands thoroughly with warm water and soap before and after handling any foods   · wash cutting boards, dishes, utensils, and countertops with hot water and soap before and after preparing any foods   · rinse raw fruits and vegetables thoroughly under running water before eating   · keep raw meat and seafood separate from other foods and use different cutting boards/utensils to handle raw meat than for other foods   · put cooked food on a freshly clean plate   · cook all of your foods thoroughly   · discard foods that have been left out for more than 2 hours   · refrigerate or freeze any foods than can spoil     There are three particular foodborne risks that you should be aware of and avoid as they can cause serious harm to your unborn child       * Listeria (a harmful bacteria)   · dont eat hot dogs or deli meats (unless theyre reheated until steaming hot)   · dont eat soft cheeses (such as Feta, Brie, Camembert) unless they are specifically labeled as being made with pasteurized milk   · dont drink raw (unpasteurized) milk   · dont eat refrigerated pates or meat spreads   · dont eat refrigerated smoked seafood unless its in a cooked dish like a casserole     * Mercury (a metal which is found in certain fish in high levels)   · dont eat shark, tilefish, gato mackerel, or swordfish   · dont eat more than 12 ounces per week of shrimp, salmon, pollock, or catfish   · when eating tuna fish, you can have up to 6 ounces per week of canned albacore tuna OR up to 12 ounces of canned light tuna     * Toxoplasma (a harmful parasite)   · cook meat thoroughly before eating   · wear gloves when gardening or handling sand from a childs sandbox   · if you ha tve a cat, have someone else change the litter box while you are pregnant  · if you HAVE to clean it yourself, be sure to wash your hands thoroughly afterwards with warm water and soap  · dont get a NEW cat while you are pregnant          Exercise and Pregnancy     Exercise has many benefits for you  It:   · Improves your posture and appearance   · Relieves back pain   · Improves circulation   · Increases flexibility   · Decreases stress   · Helps you feel good and gives you a better self-image   · Gives you energy   · Helps you sleep   · Strengthens and stretches your muscles, which can help ease the pregnancy discomforts   · Increases your stamina and flexibility     The healthier you are going into labor, the faster and easier your recovery will be after birth  Exercise may be good for your baby too  Both of you feel calm and happy after a workout  Also, your unborn baby likes the motion  How much time to exercise:   Check with your health provider before you begin and make sure you are healthy enough to start  If you exercised before pregnancy, it is safe to do moderate exercise of 30 minutes or more every day  If not, start with 20 minutes a day, 3 times a week  If you were jogging before your pregnancy, it is safe to continue    If you arent used to jogging, pregnancy is not a good time to start  Your First Trimester   In the first three months you might feel tired and sick to your stomach  Only exercise when you feel up to it  But, even a little bit of activity can boost your energy  Consider a walk, stretch, or some yoga  Your Second Trimester   You might have more energy, so take advantage of the times when youre feeling good to do activities you enjoy  Safe exercises include low-impact aerobics (which elevate your heart rate), easy dancing, walking, swimming, stationary cycling, easy cross-country skiing, and yoga  Go easy on high-impact sports like running, tennis, or sports that could cause you to fall, like downhill skiing or horseback riding  Your Third Trimester   You might feel more tired and have backaches from the extra weight you are carrying  Your third trimester is an important time to use good posture, bend at the knees to pick things up, and not lift any heavy objects  Safety Rules   · Always stretch before and after working out  · Never workout so hard that you cant talk at the same time  · Dont exercise in very hot or very cold weather  · Drink plenty of water before, during and after exercise  · Be aware of your comfort level - stop what youre doing if you have pain, if you feel faint, or if youre becoming exhausted  · Avoid lying flat on your back after the first trimester as this position can decrease blood flow to your uterus  NAUSEA AND VOMITING IN PREGNANCY    1  Eat meals and snacks slowly  2  Eat every 1-2 hours to avoid a full stomach  3  Don't skip meals, avoid empty stomach  4  Eat a snack prior to getting out of bed  5  Avoid food and beverages with a strong smell  6  Avoid dehydration - drink enough fluid to keep your urine pale yellow  7  Drink fluids before a meal to minimize the effect of a full stomach  8  Limit the amount of coffee and beverages that contain caffeine    9  Eliminate spicy, odorous, high fat (fried foods), acidic (tomato products), and sweet foods  10  Fluid that contain lemon, mint, or orange can usually be well-tolerated  11  Snacks and meals that contain low-fat protein (lean meats, fish, poultry and eggs) along with eating easily digestible carbs (fruit, rice, toast, crackers and dry cereal) may be tolerated better  12  Foods with carlos may be well-tolerated  Try using carlos root powder, capsules, or extract (up to 1000mg per day)  13  Drink liquids in small amounts  14  Try taking Vitamin B6 (50mg at bedtime, 25mg in the morning, and 25mg in the afternoon) with Unisom/Doxylamine (25mg at bedtime)  15  If nausea and vomiting persist, please contact the office  SEX AND PREGNANCY    1  Is sex safe during pregnancy? Sex is usually perfectly safe throughout pregnancy and does not hurt your growing baby  [place] and orgasms are fine unless you have a medical problem  If you are concerned, discuss it with your healthcare provider  2  How often is sex OK? Frequent sex should not hurt the baby if you are having a healthy pregnancy  3  Does sex cause miscarriage? There are no facts that link orgasms and miscarriage  However, if you have a history of miscarriage, your provider may caution you against sex and orgasm in your first trimester  4  What if I feel the baby move after sex? Is that a bad sign? No   The baby is well-protected in your uterus  And, the baby often moves a lot no matter what you do  5  Should my partner be putting weight on my abdomen? No, not as you get bigger  Find new positions for lovemaking as your pregnancy goes along  Try lying on your side or you be the one on top  Don't lie flat on your back  6  Can sex cause me to go into labor early? Not normally  However, orgasms cause the uterus to have mild contractions if you are near labor    If you have a history of early labor, your provider might warn you against intercourse and orgasm  Also, nipple stimulation causes your uterus to contract if you are near labor  Ask your provider if this is safe for you  7   Are all kinds of sex safe during pregnancy? No   Never let your partner blow air into your vagina  Do not put any object in your vagina that could cause injury or infection  If you have heavy bleeding or your water breaks during sex, stop and call your provider immediately  8  What if I don't feel like having sex? Be open about it with your partner  In early pregnancy, you may feel too tired or be sick to your stomach  In the last weeks of pregnancy, your physical changes may make you feel too large or too awkward for sex  You may also be thinking more about motherhood than sex by this time  However, in your middle months, you may actually want sex more often than usual   If your partner doesn't want sex for fear of hurting you or the baby, be reassuring that sex is safe and natural during pregnancy  9  Are there times I should avoid sex? Yes, if:  · Sex is painful  · You have unexplained vaginal bleeding  · Your have a low-lying placenta or placenta previa  · You have  labor  · Your water breaks  · You are pregnancy with twins or triplets  · You or your partner heave an unhealed herpes sore or any STD  · You can't get into a comfortable position            WARNING SIGNS DURING PREGNANCY  Call our office at [phone] for any of the followin  Vaginal bleeding  2  Sharp abdominal pain that does not go away  3  Fever (more than 100 4 and is not relieved by Tylenol)  4  Persistent vomiting lasting greater than 24 hours  5  Chest pain   6  Pain or burning when you urinate  7  Severe headache that doesn't resolve with Tylenol  8  Blurred vision or seeing spots in your vision  9  Sudden swelling of your face or hands  10  Redness, swelling or pain in a leg  11  A sudden weight gain in just a few days  12   Decrease in your baby's movement (after 28 weeks or the 6th month of pregnancy)  13  A loss of watery fluid from your vagina - can be a gush, a trickle or continuous wetness  14  After 20 weeks of pregnancy, rhythmic cramping (greater than 4 per hour) or menstrual like low/pelvic pain            BREASTFEEDING     BENEFITS FOR BABIES   * stronger immune systems (less allergies, eczema, asthma, and childhood cancers)   * less diarrhea and constipation or other GI diseases   * fewer colds and ear infections   * better vision and teeth (fewer cavities)   * improves IQ   * lower rates of diabetes and obesity in childhood     BENEFITS FOR MOMS   · promotes faster weight loss after delivery   · lower risk for postpartum depression   · lower risk for breast, uterine, and ovarian cancers   · lower risk for osteoporosis developing with age   · easier than formula - is always right with you, clean, and the right temperature   · less expensive than formulaits FREE !!!!     KEYS TO SUCCESSFUL BREASTFEEDING   · keep baby skin-to-skin until after first feeding event   · keep baby in your room with you during your hospital stay after delivery   · avoid any bottle feedings (unless medically necessary)   · limit the use of pacifiers and swaddling   · ask for help if you are having any issueslactation consultants (who specialize in breastfeeding) are available to help you   · a healthy diet for momeating a variety of foods and portions in moderation    THINGS YOU SHOULD KNOW ABOUT BREASTFEEDING   · most medications are considered compatible with breastfeeding by the [address] [place] [address] [employer], but you should check with your health care provider or lactation consultant prior to taking a new medicationjust to be sure it is safe   · alcohol (beer, wine, liquor) can be passed from mother to baby through breast milkan occasional, social drink is deemed acceptable by the [employer] [place] [employer]   more than that should be avoided   · breastfeeding is NOT an effective method of birth control   · nicotine (in cigarettes) can pass from mother to baby through breast milk   however, for mothers who smoke, it is still healthier to breastfeed than use formula   · caffeine should be limited to 1-3 cups per dayincludes coffee, soda, energy drinks           VACCINES IN PREGNANCY    TDAP  Whopping cough (or pertusSsis) can be serious for anyone, but for your , it can be life-threatning  Up to 20 babies die each year in the [place]  Due to whopping cough  About half of babies younger than 3year old who get whopping cough need treatment in the hospital   The younger the baby is when he or she gets whopping cough, the more likely he or she will need to be treated in a hospital   When you receive the whopping cough vaccine (Tdap) during your pregnancy, your body will create protective antibodies and pass some of them to your baby before birth  These antibodies can help protect your baby from getting whopping cough until they are old enough to be vaccinated themselves (usually around 7 months of age)  INFLUENZA  Changes in your immune, heart, and lung functions during pregnancy make you more likely to get seriously ill from the flu  Catching the flu also increases your chances for serious problems for your developing baby, including premature labor and delivery  It is recommended that all women who are pregnant during flu season should receive an influenza vaccine  SEAT BELT USE IN PREGNANCY    Whether you are pregnant or not, you should wear a seat belt EVERY time you are in a car  A seat belt is the best protection for both you AND your unborn child  To use a seat belt properly while you are pregnant, you may need to adjust the front seat several times as you grow so that there is always at least 10 inches between the center of your chest and the steering wheel or dashboard, yet still be able to comfortably reach the pedals    The shoulder belt should lay across your chest (between your breasts) and away from your neck  The lap belt should be secured BELOW your belly so that it fits snugly across your hips and pelvic bone  You should NOT disable the air bag on your vehicle  Seat belts and air bags work best when used together to protect you and your unborn child

## 2021-03-30 LAB
CF COMMENT: NORMAL
CFTR MUT ANL BLD/T: NORMAL
COMMENTX: (FRAGILE X): NORMAL
FMR1 GENE CGG RPT BLD/T QL: NORMAL

## 2021-03-31 LAB
CLINICAL INFO: NORMAL
ETHNIC BACKGROUND STATED: NORMAL
GENE MUT TESTED BLD/T: NORMAL
GENERAL COMMENTS:: NORMAL
LAB DIRECTOR NAME PROVIDER: NORMAL
REASON FOR REFERRAL (NARRATIVE): NORMAL
REF LAB TEST METHOD: NORMAL
SL AMB DISCLAIMER: NORMAL
SL AMB GENETIC COUNSELOR: NORMAL
SMN1 GENE MUT ANL BLD/T: NORMAL
SPECIMEN SOURCE: NORMAL

## 2021-04-01 ENCOUNTER — ROUTINE PRENATAL (OUTPATIENT)
Dept: OBGYN CLINIC | Facility: CLINIC | Age: 21
End: 2021-04-01

## 2021-04-01 DIAGNOSIS — O20.0 THREATENED ABORTION: Primary | ICD-10-CM

## 2021-04-01 PROCEDURE — 99213 OFFICE O/P EST LOW 20 MIN: CPT | Performed by: OBSTETRICS & GYNECOLOGY

## 2021-04-01 NOTE — PROGRESS NOTES
Subjective: Luz Maria Granger is a 21 y o   female who presents with acute onset of vaginal bleeding since Saturday  She denies any sexual intercourse or anything in the vagina and no abdominal trauma  She is not having any pain or cramping  She is changing 2-3 pads a day but not saturating them  Objective:    Vitals: Last menstrual period 2021  There is no height or weight on file to calculate BMI  Physical Exam  Constitutional:       General: She is not in acute distress  Appearance: She is not ill-appearing or toxic-appearing  HENT:      Head: Normocephalic and atraumatic  Pulmonary:      Effort: Pulmonary effort is normal  No respiratory distress  Abdominal:      General: Abdomen is flat  There is no distension  Palpations: Abdomen is soft  Tenderness: There is no abdominal tenderness  There is no guarding  Genitourinary:     General: Normal vulva  Vagina: No vaginal discharge  Comments: Speculum exam revealed no active bleeding, no blood in the vault, closed cervix   Skin:     General: Skin is warm and dry  Neurological:      General: No focal deficit present  Mental Status: She is alert and oriented to person, place, and time  Mental status is at baseline  Psychiatric:         Mood and Affect: Mood normal          Thought Content: Thought content normal          Judgment: Judgment normal           Assessment/Plan: This is a 22 yo  at 10w5d based on LMP who presents with intermittent vaginal bleeding/spotting without pain since Saturday  Possible threatened  given patient's report of bleeding with a closed cervix on exam, however no blood present on evaluation with speculum  Normal fetal movement and FHR of 166 on TAUS at bedside today      - PN H&P scheduled for  with Dr Cyrus Wilhelm, referral for MFM given previously, patient working on insurance coverage and expects to have coverage soon; will then plan to have a first trimester ultrasound; patient informed that insurance will likely retroactively cover her care   - Precautions given and patient encouraged to closely monitor bleeding; if bleeding gets worse or becomes painful, advised to call the office or present to the ED        Sakina Ramos MD  4/1/2021  6:39 PM

## 2021-04-02 ENCOUNTER — TELEPHONE (OUTPATIENT)
Dept: PERINATAL CARE | Facility: CLINIC | Age: 21
End: 2021-04-02

## 2021-04-07 ENCOUNTER — ROUTINE PRENATAL (OUTPATIENT)
Dept: OBGYN CLINIC | Facility: CLINIC | Age: 21
End: 2021-04-07

## 2021-04-07 VITALS
HEART RATE: 66 BPM | DIASTOLIC BLOOD PRESSURE: 70 MMHG | SYSTOLIC BLOOD PRESSURE: 105 MMHG | WEIGHT: 120.2 LBS | BODY MASS INDEX: 21.29 KG/M2

## 2021-04-07 DIAGNOSIS — O98.819 CHLAMYDIA INFECTION DURING PREGNANCY: ICD-10-CM

## 2021-04-07 DIAGNOSIS — Z34.91 PRENATAL CARE IN FIRST TRIMESTER: Primary | ICD-10-CM

## 2021-04-07 DIAGNOSIS — Z3A.11 11 WEEKS GESTATION OF PREGNANCY: ICD-10-CM

## 2021-04-07 DIAGNOSIS — A74.9 CHLAMYDIA INFECTION DURING PREGNANCY: ICD-10-CM

## 2021-04-07 PROBLEM — Z01.00 NORMAL EYE EXAM: Status: RESOLVED | Noted: 2018-06-28 | Resolved: 2021-04-07

## 2021-04-07 PROBLEM — O98.812 CHLAMYDIA INFECTION AFFECTING PREGNANCY IN SECOND TRIMESTER: Status: ACTIVE | Noted: 2021-04-07

## 2021-04-07 PROBLEM — Z00.129 WELL ADOLESCENT VISIT: Status: RESOLVED | Noted: 2018-06-28 | Resolved: 2021-04-07

## 2021-04-07 PROCEDURE — 87491 CHLMYD TRACH DNA AMP PROBE: CPT | Performed by: OBSTETRICS & GYNECOLOGY

## 2021-04-07 PROCEDURE — 99213 OFFICE O/P EST LOW 20 MIN: CPT | Performed by: OBSTETRICS & GYNECOLOGY

## 2021-04-07 PROCEDURE — 87591 N.GONORRHOEAE DNA AMP PROB: CPT | Performed by: OBSTETRICS & GYNECOLOGY

## 2021-04-07 NOTE — PROGRESS NOTES
Makeda Bauer presents today for routine OB visit at 11w4d  XE=786/70  Wt=54 5 kg (120 lb 3 2 oz); Body mass index is 21 29 kg/m² ; TWG=-0 363 kg (-12 8 oz)     Abdomen: soft, non-tender  She reports no complaints  Denies vaginal bleeding or leaking of fluid  Hx +chlamydia, LETICIA collected today  Scheduled for ultrasound - refer to [facility] for first trimester screening  Reviewed common discomforts of pregnancy in first trimester and warning signs  Advised to continue medications and return in 4 weeks  Current Outpatient Medications   Medication Instructions    metoclopramide (REGLAN) 10 mg, Oral, Every 6 hours PRN    Prenatal Vit-Fe Fumarate-FA (PRENATAL VITAMINS PO) Oral         Pregnancy Problems (from 03/29/21 to present)     No problems associated with this episode

## 2021-04-09 ENCOUNTER — TELEPHONE (OUTPATIENT)
Dept: OBGYN CLINIC | Facility: CLINIC | Age: 21
End: 2021-04-09

## 2021-04-09 LAB
C TRACH DNA SPEC QL NAA+PROBE: POSITIVE
N GONORRHOEA DNA SPEC QL NAA+PROBE: POSITIVE

## 2021-04-09 NOTE — TELEPHONE ENCOUNTER
Pt advised of positive STD testing  Special instructions explained to pt  · Antibiotics  Were ordered to your pharmacy on file  The antibiotic is used to kill the bacteria that causes chlamydia  Take them as directed  · Wash your hands often  Use soap and water  Wash your hands after you use the bathroom  This helps prevent the infection from spreading to other parts of your body, such as your eyes  · Use a latex condom during sex to prevent chlamydia and other STIs  Use a new condom each time you have sex  · Talk to your sex partners  Tell anyone you have had sex with in the last 3 months that you have chlamydia  They may also be infected and need treatment  Ask your sex partners to get tested before you have sex  · Do not have sex until you and your partner have taken all of your antibiotics  · Get regular screenings for STIs  If you are pregnant:  You can spread chlamydia to your baby while you are pregnant  Your baby could get an eye infection or pneumonia  Chlamydia may also cause your baby to be born too early  Early treatment may prevent your baby from getting chlamydia  Patient scheduled to come in today for treatment at 2:15  She is aware that she needs treatment for gonorrhea and chlamydia  She states she does not think her partner ever got treated after her positive chlamydia results on 3/20/21  I stressed the importance of abstinence until she knows that he was treated for both  After he is treated they should wait at least 7 days before they can have unprotected intercourse  Patient agreeable to plan

## 2021-04-12 ENCOUNTER — TELEPHONE (OUTPATIENT)
Dept: OBGYN CLINIC | Facility: CLINIC | Age: 21
End: 2021-04-12

## 2021-04-15 ENCOUNTER — CLINICAL SUPPORT (OUTPATIENT)
Dept: OBGYN CLINIC | Facility: CLINIC | Age: 21
End: 2021-04-15

## 2021-04-15 VITALS
BODY MASS INDEX: 21.97 KG/M2 | WEIGHT: 124 LBS | HEART RATE: 84 BPM | SYSTOLIC BLOOD PRESSURE: 111 MMHG | DIASTOLIC BLOOD PRESSURE: 73 MMHG

## 2021-04-15 DIAGNOSIS — A54.9 GONORRHEA: ICD-10-CM

## 2021-04-15 RX ORDER — AZITHROMYCIN 250 MG/1
1000 TABLET, FILM COATED ORAL EVERY 24 HOURS
Status: DISCONTINUED | OUTPATIENT
Start: 2021-04-15 | End: 2021-09-22

## 2021-04-15 RX ADMIN — CEFTRIAXONE 500 MG: 500 INJECTION, POWDER, FOR SOLUTION INTRAMUSCULAR; INTRAVENOUS at 14:46

## 2021-04-15 RX ADMIN — AZITHROMYCIN 1000 MG: 250 TABLET, FILM COATED ORAL at 14:46

## 2021-04-15 NOTE — PROGRESS NOTES
Pt advised of positive STD testing  Special instructions explained to pt  · Antibiotics    The antibiotic is used to kill the bacteria that causes GC/CT  · Wash your hands often  Use soap and water  Wash your hands after you use the bathroom  This helps prevent the infection from spreading to other parts of your body, such as your eyes  · Use a latex condom during sex to prevent chlamydia and other STIs  Use a new condom each time you have sex  · Talk to your sex partners  Tell anyone you have had sex with in the last 3 months that you have chlamydia  They may also be infected and need treatment  Ask your sex partners to get tested before you have sex  · Do not have sex until you and your partner have taken all of your antibiotics  · Get regular screenings for STIs  APPT  Given for 5/5/2021  If you are pregnant:  You can spread chlamydia to your baby while you are pregnant  Your baby could get an eye infection or pneumonia  Chlamydia may also cause your baby to be born too early  Early treatment may prevent your baby from getting chlamydia

## 2021-04-22 ENCOUNTER — ROUTINE PRENATAL (OUTPATIENT)
Dept: PERINATAL CARE | Facility: OTHER | Age: 21
End: 2021-04-22
Payer: COMMERCIAL

## 2021-04-22 VITALS
HEART RATE: 98 BPM | BODY MASS INDEX: 22.15 KG/M2 | DIASTOLIC BLOOD PRESSURE: 70 MMHG | SYSTOLIC BLOOD PRESSURE: 110 MMHG | WEIGHT: 125 LBS | HEIGHT: 63 IN

## 2021-04-22 DIAGNOSIS — Z36.82 ENCOUNTER FOR ANTENATAL SCREENING FOR NUCHAL TRANSLUCENCY: Primary | ICD-10-CM

## 2021-04-22 DIAGNOSIS — A74.9 CHLAMYDIA INFECTION AFFECTING PREGNANCY IN FIRST TRIMESTER: ICD-10-CM

## 2021-04-22 DIAGNOSIS — O28.3 INCREASED NUCHAL TRANSLUCENCY SPACE ON FETAL ULTRASOUND: ICD-10-CM

## 2021-04-22 DIAGNOSIS — Z3A.13 13 WEEKS GESTATION OF PREGNANCY: ICD-10-CM

## 2021-04-22 DIAGNOSIS — O98.811 CHLAMYDIA INFECTION AFFECTING PREGNANCY IN FIRST TRIMESTER: ICD-10-CM

## 2021-04-22 PROCEDURE — 99203 OFFICE O/P NEW LOW 30 MIN: CPT | Performed by: OBSTETRICS & GYNECOLOGY

## 2021-04-22 PROCEDURE — 76813 OB US NUCHAL MEAS 1 GEST: CPT | Performed by: OBSTETRICS & GYNECOLOGY

## 2021-04-22 RX ORDER — ASPIRIN 81 MG/1
162 TABLET, CHEWABLE ORAL DAILY
Qty: 180 TABLET | Refills: 0 | Status: SHIPPED | OUTPATIENT
Start: 2021-04-22 | End: 2021-09-22

## 2021-04-22 NOTE — PATIENT INSTRUCTIONS
Thank you for choosing us for your  care today  If you have any questions about your ultrasound or care, please do not hesitate to contact us or your primary obstetrician  Please call us once your insurance becomes active so that we can get the genetic screening bloodwork done  Please begin taking aspirin 162mg daily (two of the 81mg tablets) for the prevention of preeclampsia  If you have asthma and notice an increase in symptom frequency or severity, consider stopping this medication  Some general instructions for your pregnancy are:     Protect against coronavirus: Continue to practice social distancing, wear a mask, and wash your hands often  Pregnant women are increased risk of severe COVID  Notify your primary care doctor if you have any symptoms including cough, shortness of breath or difficulty breathing, fever, chills, muscle pain, sore throat, or loss of taste or smell  Pregnant women can receive the coronavirus vaccine   Exercise: Aim for 22 minutes per day (150 minutes per week) of regular exercise  Walking is great!  Nutrition: aim for calcium-rich and iron-rich foods as well as healthy sources of protein   Protect against the flu: get yourself and your entire household vaccinated against influenza  This will protect your baby   Learn about Preeclampsia: preeclampsia is a common, serious high blood pressure complication in pregnancy  A blood pressure of 168WXUC (systolic or top number) or 20MUIA (diastolic or bottom number) is not normal and needs evaluation by your doctor   If you smoke, try to reduce how many cigarettes you smoke or try to quit completely  Do not vape   Other warning signs to watch out for in pregnancy or postpartum: chest pain, obstructed breathing or shortness of breath, seizures, thoughts of hurting yourself or your baby, bleeding, a painful or swollen leg, fever, or headache (see [employer]WH[facility] POST-BIRTH Warning Signs campaign)    If these happen call 911  Itching is also not normal in pregnancy and if you experience this, especially over your hands and feet, potentially worse at night, notify your doctors   Lastly, if you are contacted regarding participation in a survey about your experience in our office, please know that we take any feedback you provide seriously and use it to improve how we deliver care through our center

## 2021-04-22 NOTE — LETTER
April 22, 2021     Vera Pryor MD  [address]    Patient: Tung Aj   YOB: 2000   Date of Visit: 4/22/2021       Dear Dr Wagoner Course: Thank you for referring Tung Aj to me for evaluation  Below are my notes for this consultation  Her NT was increased however she doesn't have insurance at this time and I instructed her to notify us if/when her insurance application goes through so that she can get testing at that time  If you have questions, please do not hesitate to call me  I look forward to following your patient along with you  Sincerely,        Derek Torres MD        CC: No Recipients  Derek Torres MD  4/22/2021  3:25 PM  Sign when Signing Visit  [address]: Ms Melanie Kimble was seen today at 13w5d for nuchal translucency ultrasound  See ultrasound report under "OB Procedures" tab  Please don't hesitate to contact our office with any concerns or questions    Derek Torres MD

## 2021-04-22 NOTE — PROGRESS NOTES
[address]: Ms Hellen Jefferson was seen today at 13w5d for nuchal translucency ultrasound  See ultrasound report under "OB Procedures" tab  Please don't hesitate to contact our office with any concerns or questions    Jose Martin Tyson MD

## 2021-04-23 ENCOUNTER — TELEPHONE (OUTPATIENT)
Dept: PERINATAL CARE | Facility: CLINIC | Age: 21
End: 2021-04-23

## 2021-04-23 NOTE — TELEPHONE ENCOUNTER
----- Message from Leena Coral sent at 4/23/2021 11:57 AM EDT -----  Regarding: questions re: yesterday's visit  Patient called asking questions regarding yesterday's visit with you in [place]  She can not remember what was said regarding the area behind the baby's neck  She would like to speak with you if you can call her when you are available  Her # [phone]      Thank you,     Luz Maria

## 2021-04-26 ENCOUNTER — TELEPHONE (OUTPATIENT)
Dept: PERINATAL CARE | Facility: CLINIC | Age: 21
End: 2021-04-26

## 2021-04-26 NOTE — TELEPHONE ENCOUNTER
Called patient to confirm Maternal Fetal Medicine virtual appt scheduled for 4/27/21  2:30  MARIA G RENO  Confirmed with patient that she will receive a prompt, by her preference of email  Explained procedure for virtual visit and requested she be ready to log into appointment approximately 10 minutes prior to scheduled start time  Reminder the [facility] Provider will send her the link to join virtual appt near the scheduled appointment time  Also confirmed with pt current email info and current insurance information  Patient instructed to call [facility] office @ #[phone] for technical support issues or any questions regarding the procedure for virtual appt       LEFT VOICEMAIL FOR PT WITH INFORMATION ABOVE

## 2021-04-27 ENCOUNTER — TELEMEDICINE (OUTPATIENT)
Dept: PERINATAL CARE | Facility: CLINIC | Age: 21
End: 2021-04-27

## 2021-04-27 DIAGNOSIS — Z31.5 ENCOUNTER FOR PROCREATIVE GENETIC COUNSELING: ICD-10-CM

## 2021-04-27 DIAGNOSIS — O35.9XX0 FETAL ABNORMALITY AFFECTING MANAGEMENT OF MOTHER, SINGLE OR UNSPECIFIED FETUS: Primary | ICD-10-CM

## 2021-04-27 PROCEDURE — NC001 PR NO CHARGE

## 2021-04-29 NOTE — PROGRESS NOTES
Genetic Counseling   High-Risk Gestation Note    Appointment Date:  2021  Referred By: Eleonora Michaud MD  YOB: 2000  Partner:  Jordyn Farley  Indication for Visit:  abnormal ultrasound screen  Pregnancy History:   Estimated Date of Delivery: 10/23/21  Estimated Gestational Age: 14w2d    Virtual Regular Visit      Assessment/Plan:    Problem List Items Addressed This Visit     None      Visit Diagnoses     Fetal abnormality affecting management of mother, single or unspecified fetus    -  Primary    Encounter for procreative genetic counseling                   Reason for visit is   Chief Complaint   Patient presents with    Virtual Regular Visit        Encounter provider Edwar Lima    Provider located at [address] [place] [id number] [phone]      Recent Visits  No visits were found meeting these conditions  Showing recent visits within past 7 days and meeting all other requirements     Future Appointments  No visits were found meeting these conditions  Showing future appointments within next 150 days and meeting all other requirements        The patient was identified by name and date of birth  Makeda Bauer was informed that this is a telemedicine visit and that the visit is being conducted through [address] [place] [address] Now and patient was informed that this is a secure, HIPAA-compliant platform  She agrees to proceed     My office door was closed  No one else was in the room  She acknowledged consent and understanding of privacy and security of the video platform  The patient has agreed to participate and understands they can discontinue the visit at any time  Patient is aware this is a billable service  Barby Escobedo is a 21 y o  female who presented for genetic counseling to discuss the abnormal finding of an increased nuchal translucency at her ultrasound on 21 at 13w5d gestation      We reviewed with the patient that nuchal translucency (NT) is defined as a subcutaneous accumulation of fluid in the fetal neck  It may be recognized by ultrasound between the 10th and the 14th week of gestation as a sonolucent area in the region of the fetal neck  We discussed that there are several proposed mechanisms of an increased NT, including cardiac failure secondary to abnormalities of the heart or great arteries, or altered composition of the subcutaneous tissue resulting from various fetal chromosome abnormalities, fetal infections, renal or other genitourinary abnormalities, or several other genetic syndromes such as single gene disorders (Jane syndrome) or microdeletions  It is also possible that there is a healthy fetus with an isolated increased nuchal translucency  Prognosis depends on the underlying etiology  We discussed that given a measurement of 2 9mm (in the 96th percentile for CRL), the risk for a chromosome abnormality is approximately 3 7% and the chance for any congenital anomaly is approximately 2 5%  The chance of fetal miscarriage or death is approximately 1 3%  The risks, benefits, and limitations of amniocentesis were discussed with the patient  Amniocentesis is performed under direct real time ultrasound visualization to avoid both the fetus and the placenta  Once amniotic fluid is withdrawn, laboratory analysis is performed and amniotic fluid alpha-fetoprotein, as well as chromosome and/or microarray analysis is undertaken  Molecular testing for Brookline syndrome can also be done  The risk of genetic amniocentesis includes, but is not limited to less than 1 in 300 pregnancy loss rate or  delivery rate if 23 weeks or greater, infection, bleeding, rupture of membranes, failure of cells to grow, karyotype error, laboratory error, etc   Occasionally a repeat amniocentesis is necessary due to cell culture failure    Chromosome/microarray analysis from amniocentesis is 99 9% accurate and alpha-fetoprotein analysis can detect approximately 95% of open neural tube defects  We reviewed the testing option of cell free fetal DNA screening (also known as noninvasive prenatal testing or NIPT)  We discussed that it is a serum test to identify fragments of fetal DNA in maternal blood  We reviewed the benefits and limitations of cell free fetal DNA screening in detecting Down syndrome, Trisomy 13, Trisomy 25 and sex chromosome aneuploidies  We also discussed that cell free fetal DNA screening does not detect additional chromosomal abnormalities (or Jane syndrome) and the possibility of a failed test result  As cell free fetal DNA screening does not detect open neural tube defects, MSAFP screening is available at 15-20 weeks gestation  We reviewed that level II anatomy ultrasound is typically performed at approximately 20 weeks gestation but can be done earlier at about 16 weeks gestation  A follow up ultrasound at 20 weeks is then recommended for completion of anatomy  Level II ultrasound evaluation is between 60-80% accurate in detecting major physical birth defects and variations in fetal development that may be associated with chromosome abnormalities  Level II ultrasound evaluation is not able to detect all birth defects or health problems  After discussing the available prenatal testing and screening options this patient declined amniocentesis secondary to procedural related complications  She elected to pursue NIPT when her insurance is active  Navdeep Jaycob was instructed to contact our office at that time to arrange for the blood work  She also elected to pursue MSAFP screening, early anatomy ultrasound, and Level II anatomy ultrasound at the appropriate times  Histories for the patient and her partner's family were taken during our session and was noncontributory    The family history was not significant for genetic diseases or disorders, intellectual disability, birth defects, fetal loss, or consanguinity  Patient reports being of  descent and that her partner is of [address] [place] [address] descent  She denies either of them having known Ashkenazi Spiritism ancestry  Stephon Sumner already had cystic fibrosis, spinal muscular atrophy, Fragile X and hemoglobinopathy carrier screening performed and was negative for all  The benefits and limitations of expanded carrier screening was discussed which the patient declined  She was informed that it is available to her at any time should she change her mind  Lastly, we discussed the fact that everyone in the general population regardless of age, family history, or medical background has approximately a 3-5% risk of having a child with some type of congenital anomaly, genetic disease or intellectual disability  Currently there are no tests available to rule out all birth defects or health problems  Stephon Sumner was provided with our contact information  I encouraged her to call with any questions or concerns  Plan/Tests Ordered:  1) Patient declined amniocentesis and expanded carrier screening  2) Patient elected NIPT once insurance is active  3) MSAFP screening at 15-20 weeks gestation  4) Early anatomy ultrasound scheduled for 5/17/21  5) Level II anatomy ultrasound at approximately 20 weeks gestation  6) Possible fetal echocardiogram per MFM discretion  HPI     History reviewed  No pertinent past medical history      Past Surgical History:   Procedure Laterality Date    MOUTH SURGERY      NO PAST SURGERIES         Current Outpatient Medications   Medication Sig Dispense Refill    aspirin 81 mg chewable tablet Chew 2 tablets (162 mg total) daily 180 tablet 0    metoclopramide (REGLAN) 10 mg tablet Take 1 tablet (10 mg total) by mouth every 6 (six) hours as needed (nausea and/or vomiting) (Patient not taking: Reported on 3/23/2021) 30 tablet 0    Prenatal Vit-Fe Fumarate-FA (PRENATAL VITAMINS PO) Take by mouth       Current Facility-Administered Medications   Medication Dose Route Frequency Provider Last Rate Last Admin    azithromycin (ZITHROMAX) tablet 1,000 mg  1,000 mg Oral Q24H Lana Greenwood MD   1,000 mg at 04/15/21 1446        Allergies   Allergen Reactions    Kiwi Extract - Food Allergy Shortness Of Breath and Facial Swelling       Review of Systems    Video Exam    There were no vitals filed for this visit  Physical Exam     I spent 45 minutes directly with the patient during this visit      VIRTUAL VISIT DISCLAIMER    Ralph Long acknowledges that she has consented to an online visit or consultation  She understands that the online visit is based solely on information provided by her, and that, in the absence of a face-to-face physical evaluation by the physician, the diagnosis she receives is both limited and provisional in terms of accuracy and completeness  This is not intended to replace a full medical face-to-face evaluation by the physician  Ralph Long understands and accepts these terms

## 2021-05-04 NOTE — PROGRESS NOTES
OB/GYN prenatal visit    S: 21 y o   15w3d here for PN visit  She has no obstetric complaints, including pelvic pain, contractions, vaginal bleeding, loss of fluid, abnormal discharge or dysuria  Patient was positive for chlamydia on 3/20 she was appropriately treated and counseled that her partner should also be treated  At her LETICIA on  patient was positive for chlamydia AND gonorrhea  She was treated and again counseled that she should abstain from intercourse with her partner until she knows he has been adequately treated  Patient informed me that her partner is "refusing treatment"  I counseled the patient that she should not engage in sexual activity with her partner at all until it is confirmed that he's been properly treated  Continued re-infection could compromise the pregnancy, lead to  labor, or spread infection to the fetus at time of delivery  Patient was seen at [facility] for first trimester screening  An increased nuchal translucency was idenitified  She met with the genetic counselor and was presented with options for diagnostic testing  She declined amniocentesis and opted for NIPT when her insurance is active  O:  There were no vitals filed for this visit  General Appearance: Awake, alert and not in acute distress  CV: RRR   Pulm: Lungs CTA, bilaterally   GI: Gravid, non-tender to palpation in all four quadrants   Fundal: 15cm  FHT: 140s      A/P:    Pregnancy at 15 weeks   FHT: 140   Fundal Height: 15   Blood Pressure:  Normotensive   Contraception plan: patient unsure at this time  We discussed options including condoms, OCPs, vaginal ring, patch, progesterone only pills, depo, LARC and natural family planning  We will continue discussion      Birth Plan: Patient plans on delivering at [facility]    Chlamydia and Gonorrhea Infection in Pregnancy   3/20- Positive for chlamydia   - LETICIA, Positive for Gonorrhea/Chlamydia   Patient treated both times   Repeat GC/CT today   Advised patient to avoid sexual activity with her partner until he is treated     RTC 4 weeks     D/w Dr Claritza Fuentes MD  5/4/2021  2:04 PM

## 2021-05-05 ENCOUNTER — ROUTINE PRENATAL (OUTPATIENT)
Dept: OBGYN CLINIC | Facility: CLINIC | Age: 21
End: 2021-05-05

## 2021-05-05 VITALS
SYSTOLIC BLOOD PRESSURE: 107 MMHG | WEIGHT: 125 LBS | BODY MASS INDEX: 22.14 KG/M2 | DIASTOLIC BLOOD PRESSURE: 69 MMHG | HEART RATE: 69 BPM

## 2021-05-05 DIAGNOSIS — O98.819 CHLAMYDIA INFECTION DURING PREGNANCY: Primary | ICD-10-CM

## 2021-05-05 DIAGNOSIS — A74.9 CHLAMYDIA INFECTION DURING PREGNANCY: Primary | ICD-10-CM

## 2021-05-05 DIAGNOSIS — O98.212 MATERNAL GONORRHEA IN SECOND TRIMESTER: ICD-10-CM

## 2021-05-05 PROCEDURE — 87491 CHLMYD TRACH DNA AMP PROBE: CPT | Performed by: OBSTETRICS & GYNECOLOGY

## 2021-05-05 PROCEDURE — 87591 N.GONORRHOEAE DNA AMP PROB: CPT | Performed by: OBSTETRICS & GYNECOLOGY

## 2021-05-05 PROCEDURE — 99213 OFFICE O/P EST LOW 20 MIN: CPT | Performed by: OBSTETRICS & GYNECOLOGY

## 2021-05-06 ENCOUNTER — TELEPHONE (OUTPATIENT)
Dept: OBGYN CLINIC | Facility: CLINIC | Age: 21
End: 2021-05-06

## 2021-05-06 LAB
C TRACH DNA SPEC QL NAA+PROBE: NEGATIVE
N GONORRHOEA DNA SPEC QL NAA+PROBE: NEGATIVE

## 2021-05-17 ENCOUNTER — TELEPHONE (OUTPATIENT)
Dept: PERINATAL CARE | Facility: OTHER | Age: 21
End: 2021-05-17

## 2021-05-24 ENCOUNTER — TELEPHONE (OUTPATIENT)
Dept: OBGYN CLINIC | Facility: CLINIC | Age: 21
End: 2021-05-24

## 2021-05-24 NOTE — TELEPHONE ENCOUNTER
Pt called C/O intermittent backache at 18 weeks pregnant  Explained she can use warm heat, warm, shower and Extra-Strength Tylenol  Pt states it occurs after laying on her back to sleep  Discussed alternate positions and use of extra pillows  To call with needs or concerns, VB, LOF or CTX  Pt verbalized understanding of all discussed

## 2021-06-02 ENCOUNTER — ROUTINE PRENATAL (OUTPATIENT)
Dept: OBGYN CLINIC | Facility: CLINIC | Age: 21
End: 2021-06-02

## 2021-06-02 VITALS
SYSTOLIC BLOOD PRESSURE: 113 MMHG | HEART RATE: 85 BPM | DIASTOLIC BLOOD PRESSURE: 75 MMHG | BODY MASS INDEX: 22.86 KG/M2 | HEIGHT: 63 IN | WEIGHT: 129 LBS

## 2021-06-02 DIAGNOSIS — Z34.92 PRENATAL CARE IN SECOND TRIMESTER: Primary | ICD-10-CM

## 2021-06-02 DIAGNOSIS — Z3A.19 19 WEEKS GESTATION OF PREGNANCY: ICD-10-CM

## 2021-06-02 PROCEDURE — 99213 OFFICE O/P EST LOW 20 MIN: CPT | Performed by: OBSTETRICS & GYNECOLOGY

## 2021-06-02 NOTE — PROGRESS NOTES
Vandana Daniels presents today for routine OB visit at 19w4d  Blood Pressure: 113/75  Wt=58 5 kg (129 lb); Body mass index is 22 85 kg/m² ; TWG=3 629 kg (8 lb)  Fetal Heart Rate: 140; Abdomen: gravid, soft, non-tender  She reports no compliants  Denies uterine contractions or persistent cramping  Denies vaginal bleeding or leaking of fluid  Reports fetal movement  Scheduled for ultrasound 06/09/21  Quad screen ordered  Reviewed common discomforts of pregnancy in second trimester and warning signs  Advised to continue medications and return in 4 weeks        Current Outpatient Medications   Medication Instructions    aspirin 162 mg, Oral, Daily    metoclopramide (REGLAN) 10 mg, Oral, Every 6 hours PRN    Prenatal Vit-Fe Fumarate-FA (PRENATAL VITAMINS PO) Oral         Pregnancy Problems (from 03/29/21 to present)     Problem Noted Resolved    Chlamydia infection affecting pregnancy in first trimester 4/7/2021 by Cornelia Weston MD No    Overview Signed 4/7/2021 11:05 AM by Cornelia Weston MD     LETICIA collected 04/07/21

## 2021-06-09 ENCOUNTER — ROUTINE PRENATAL (OUTPATIENT)
Dept: PERINATAL CARE | Facility: OTHER | Age: 21
End: 2021-06-09
Payer: COMMERCIAL

## 2021-06-09 VITALS
HEART RATE: 79 BPM | WEIGHT: 131.2 LBS | SYSTOLIC BLOOD PRESSURE: 116 MMHG | BODY MASS INDEX: 23.25 KG/M2 | HEIGHT: 63 IN | DIASTOLIC BLOOD PRESSURE: 74 MMHG

## 2021-06-09 DIAGNOSIS — Z3A.20 20 WEEKS GESTATION OF PREGNANCY: ICD-10-CM

## 2021-06-09 DIAGNOSIS — O28.3 INCREASED NUCHAL TRANSLUCENCY SPACE ON FETAL ULTRASOUND: Primary | ICD-10-CM

## 2021-06-09 DIAGNOSIS — Z36.86 ENCOUNTER FOR ANTENATAL SCREENING FOR CERVICAL LENGTH: ICD-10-CM

## 2021-06-09 PROCEDURE — 76811 OB US DETAILED SNGL FETUS: CPT | Performed by: OBSTETRICS & GYNECOLOGY

## 2021-06-09 PROCEDURE — 76817 TRANSVAGINAL US OBSTETRIC: CPT | Performed by: OBSTETRICS & GYNECOLOGY

## 2021-06-09 PROCEDURE — 99213 OFFICE O/P EST LOW 20 MIN: CPT | Performed by: OBSTETRICS & GYNECOLOGY

## 2021-06-09 NOTE — PROGRESS NOTES
The patient was seen today for an ultrasound  Please see ultrasound report (located under Ob Procedures) for additional details  Thank you very much for allowing us to participate in the care of this very nice patient  Should you have any questions, please do not hesitate to contact me  Dave Young MD [address] [facility]  Attending Physician, Mellisa

## 2021-06-09 NOTE — PATIENT INSTRUCTIONS
For a COVID-19 Vaccine in Pregnancy Decision Aid, go to https://foamcast [email or web address]/    The ABM (Academy of Breastfeeding Medicine) Statement on Considerations for COVID-19 Vaccination in Lactation is available at: TravelLesson tn  Finally, the CDC statement on Vaccination Consideration for People who are Pregnant or Breastfeeding is available at:  Christiano beasley      Here are the images (12/28/20) for the decision aid:

## 2021-07-01 ENCOUNTER — ROUTINE PRENATAL (OUTPATIENT)
Dept: OBGYN CLINIC | Facility: CLINIC | Age: 21
End: 2021-07-01

## 2021-07-01 VITALS
WEIGHT: 134 LBS | DIASTOLIC BLOOD PRESSURE: 76 MMHG | SYSTOLIC BLOOD PRESSURE: 112 MMHG | HEART RATE: 96 BPM | BODY MASS INDEX: 23.74 KG/M2

## 2021-07-01 DIAGNOSIS — Z34.92 PRENATAL CARE IN SECOND TRIMESTER: Primary | ICD-10-CM

## 2021-07-01 DIAGNOSIS — Z3A.23 23 WEEKS GESTATION OF PREGNANCY: ICD-10-CM

## 2021-07-01 PROBLEM — Z3A.20 20 WEEKS GESTATION OF PREGNANCY: Status: RESOLVED | Noted: 2021-06-09 | Resolved: 2021-07-01

## 2021-07-01 PROCEDURE — 99214 OFFICE O/P EST MOD 30 MIN: CPT | Performed by: NURSE PRACTITIONER

## 2021-07-01 NOTE — PROGRESS NOTES
Assessment & Plan  21 y o   at 23w5d presenting for routine prenatal visit  Pt had WNL growth and anatomy on Level II U/S  Pt has 32 week folow up growth scheduled  WNL respiratory effort, negative cough or SOB    Problem List Items Addressed This Visit        Other    RESOLVED: 20 weeks gestation of pregnancy      Other Visit Diagnoses     Prenatal care in second trimester    -  Primary        ____________________________________________________________  Subjective  She is without complaint  She denies contractions, loss of fluid, or vaginal bleeding  She feels regular fetal movements      Objective  /76   Pulse 96   Wt 60 8 kg (134 lb)   LMP 2021 (Exact Date)   BMI 23 74 kg/m²     Fetal Heart Rate: 150    Patient's Active Problem List  Patient Active Problem List   Diagnosis    Depression    Chlamydia infection affecting pregnancy in first trimester    Increased nuchal translucency space on fetal ultrasound     Plan  To call with vaginal bleeding, loss of fluid, regular contractions, decreased fetal movement, other needs or concerns  Return in 4 weeks  Pt verbalized understanding of all discussed

## 2021-07-01 NOTE — PATIENT INSTRUCTIONS
WARNING SIGNS DURING PREGNANCY  Call our office at [phone] for any of the followin  Vaginal bleeding  2  Sharp abdominal pain that does not go away  3  Fever (more than 100 4 and is not relieved by Tylenol)  4  Persistent vomiting lasting greater than 24 hours  5  Chest pain   6  Pain or burning when you urinate  7  Severe headache that doesn't resolve with Tylenol  8  Blurred vision or seeing spots in your vision  9  Sudden swelling of your face or hands  10  Redness, swelling or pain in a leg  11  A sudden weight gain in just a few days  12  Decrease in your baby's movement (after 28 weeks or the 6th month of pregnancy)  13  A loss of watery fluid from your vagina - can be a gush, a trickle or continuous wetness  14  After 20 weeks of pregnancy, rhythmic cramping (greater than 4 per hour) or menstrual like low/pelvic pain  Coronavirus (COVID-19) pregnancy FAQs: Medical experts answer your questions  From [employer] cy  com/0_[email or web address]-covid-19-pregnancy-faq-medical-utqjiqf-jqhyca-[email or web address]_[id number]  bc (courtesy of [facility])  As of 3/18/20  By Marcelino Lawson 39  Medically reviewed by Society for Maternal-Fetal Medicine       We asked parents-to-be to send us their most pressing questions about coronavirus (COVID-19)  Among them: Is it still safe to deliver in a hospital? What if my ob-gyn has the virus? We sent those questions to the top docs at the Society for Maternal-Fetal Medicine  Here are their answers  The coronavirus (COVID-19) pandemic has been declared a national emergency in the [facility] by [employer]  Moms-to-be like you are concerned about everything from getting medicines to managing disruptions at work  But above and beyond any worry about lifestyle changes is a focus on your health and the impact of COVID-19 on your pregnancy  We asked obstetrics doctors who handle the most complicated pregnancy issues to answer your questions about the coronavirus  Here are their responses, provided by Dr Janeth Prescott and her colleagues at the Society for [employer]  Am I at more risk for COVID-19 if I'm pregnant? We don't know  Pregnancy does change your immune system in ways that might make you more susceptible to viral respiratory infections like COVID-19  If you become infected, you might also be at higher risk for more severe illness compared to the general population  Although this does not appear to be the case with COVID-19, based on limited data so far, a higher risk has been true for pregnant women with other coronavirus infections (SARS-CoV and MERS-CoV) and other viral respiratory infections, such as flu  It's important to take preventive actions to avoid infection, such as washing your hands often and avoiding people who are sick  How might coronavirus affect my pregnancy? Again, we don't know  Women with other coronavirus infections (such as SARS-CoV) did not have miscarriage or stillbirth at higher rates than the general population  We know that having other respiratory viral infections during pregnancy, such as flu, has been associated with problems like low birth weight and  birth  Also, having a high fever early in pregnancy may increase the risk of certain birth defects  Could I transmit coronavirus to my baby during pregnancy or delivery? We don't know whether you could transmit COVID-19 to your baby before or during delivery  However, among the few case studies of infants born to mothers with COVID-23 published in peer-reviewed literature, none of the infants tested positive for the virus  Also, there was no virus detected in samples of amniotic fluid or breast milk  There have been a few reports of newborns as young as a few days old with infection, suggesting that a mother can transmit the infection to her infant through close contact after delivery    There have been no reports of mother-to-baby transmission for other coronaviruses (MERS-CoV and SARS-CoV), although only limited information is available  Is it safe for me to deliver at a hospital where there have been COVID-19 cases? Yes  We know that COVID-19 is a very scary virus  The good news is that hospitals are taking great precautions to keep patients and healthcare providers safe  When a patient is even suspected to have COVID-19, they are placed in a negative pressure room  (Think of these rooms as vacuums that suck and filter the air so it's safe for the other people in the hospital)  This makes it possible for you to deliver at the hospital without putting you or your baby at risk  Hospitals are also implementing stricter visiting policies to keep patients safe  It's worth calling your hospital to check if there are any new regulations to be aware of  What plans should I make now in case the hospital system is overwhelmed when it's time for me to deliver? This is a great question to talk with your doctor or midwife about when you're 34 to 36 weeks pregnant  Every hospital is making different plans for dealing with this scenario  I work in healthcare  Should I ask my doctor to excuse me from work until the baby is born? What if I work in a school, the travel [address], or some other high-risk setting? Healthcare facilities should take care to limit the exposure of pregnant employees to patients with confirmed or suspected COVID-19, just as they would with other infectious cases  If you continue working, be sure to follow the CDC's risk assessment and infection control guidelines  If you work in a school, [employer], or other high-risk setting, talk with your employer about what it's doing to protect employees and minimize infection risks  Wash your hands often  What if my OB gets COVID-19?   If your doctor or midwife tests positive for COVID-19, they will need to be quarantined until they recover and are no longer at risk of transmitting the virus  In this case, you'll be assigned to another OB in your doctor's practice (or you may choose another practitioner yourself)  Ask your new OB or your doctor's office if you should self-quarantine or be tested for the virus  (It will depend on when you last saw your provider and when that person tested positive )    Should we hold off on trying to conceive because of COVID-19? At this time, there's no reason to hold off on trying to get pregnant, but the data we have is really limited  For example, we don't think the virus causes birth defects or increases your risk of miscarriage  But we don't know whether you could transmit COVID-19 to your baby before or during delivery  We also don't know if the virus lives in semen or can be sexually transmitted  We have a babymoon scheduled in the next few months - should we cancel? If you're planning to travel internationally or on a cruise, you should strongly consider canceling  At this time, the virus has reached more than 140 countries, and there are travel bans to [place], most of [place], and [place]  Places where large numbers of people gather are at highest risk, especially airports and cruise ships  If you're planning travel in the [place] , note that any travel setting increases your risk of exposure, and there may be travel bans even in [place] by the time you're scheduled to go  Even if you're state is not blocked, you'll definitely want to avoid traveling to communities where the virus is spreading  To find out where these places are, check The [employer] Times map based on CDC data  For the most current advice to help you avoid exposure, check the CDC's COVID-19 travel page  Will the hospital separate me from my  and keep the baby in quarantine?   If you test positive for COVID-19 or have been exposed but have no symptoms, the CDC, [employer] of Obstetricians and Gynecologists, and the Society for [employer] all recommend that you be  from your baby to decrease the risk of transmission to the baby  This would last until you are no longer at risk of transmitting the virus  If you do not have COVID-19 and have not been exposed to the virus, the hospital will not separate you from your   My hospital is restricting visitors and only allowing one support person  If my support person leaves after the delivery, will they be allowed to come back? Every hospital has different policies  Contact your hospital or labor and delivery unit a week or so before delivery to get the most up-to-date restrictions  In general, if your support person needs to leave, they would be allowed back unless they knew they were exposed to COVID-19 after leaving your company  [facility] understands that the coronavirus (COVID-19) pandemic is an evolving story and that your questions will change over time  We'll continue asking moms and dads in our Community what they want to know, and we'll get the answers from experts to keep them - and you - informed and supported  My mom was planning to fly here to help me care for my new baby after delivery  Should I tell her not to come? Yes  If your mom is over 61 or has any serious chronic medical conditions (such as heart disease, lung disease, or diabetes), she is at higher risk of serious illness from COVID-19 and should avoid air travel  And remember that any travel setting increases a person's risk of exposure  So, it may be risky to have her around the baby after she has been traveling  For the most current advice on traveling, check the CDC's COVID-19 travel page  [facility] understands that the coronavirus pandemic is an evolving story and that your questions will change over time  We'll continue asking moms and dads in our Community what they want to know, and we'll get the answers from experts to keep them - and you - informed and supported      Return in 4 weeks

## 2021-07-19 ENCOUNTER — TELEPHONE (OUTPATIENT)
Dept: OBGYN CLINIC | Facility: CLINIC | Age: 21
End: 2021-07-19

## 2021-07-19 NOTE — TELEPHONE ENCOUNTER
----- Message from Adalberto Lopez sent at 7/17/2021 11:02 AM EDT -----  Regarding: Non-Urgent Medical Question  Contact: [phone]  Good morning  I have a dentist appt on Tuesday at 3pm  I was wondering if you can fax something over to them or send something to my email stating that it's okay for them to see me since i'm pregnant? my email is [email or web address]  They're phone number is [phone]  Not sure of a fax number

## 2021-07-28 ENCOUNTER — ROUTINE PRENATAL (OUTPATIENT)
Dept: OBGYN CLINIC | Facility: CLINIC | Age: 21
End: 2021-07-28

## 2021-07-28 VITALS
HEIGHT: 63 IN | BODY MASS INDEX: 24.63 KG/M2 | DIASTOLIC BLOOD PRESSURE: 71 MMHG | HEART RATE: 66 BPM | WEIGHT: 139 LBS | SYSTOLIC BLOOD PRESSURE: 111 MMHG

## 2021-07-28 DIAGNOSIS — Z34.93 PRENATAL CARE IN THIRD TRIMESTER: Primary | ICD-10-CM

## 2021-07-28 DIAGNOSIS — Z3A.27 27 WEEKS GESTATION OF PREGNANCY: ICD-10-CM

## 2021-07-28 PROCEDURE — 99213 OFFICE O/P EST LOW 20 MIN: CPT | Performed by: OBSTETRICS & GYNECOLOGY

## 2021-07-28 NOTE — PROGRESS NOTES
Royer Lima presents today for routine OB visit at 27w4d  Blood Pressure: 111/71  Wt=63 kg (139 lb); Body mass index is 24 62 kg/m² ; TWG=8 165 kg (18 lb)  Fetal Heart Rate: 143; Fundal Height (cm): 28 cm  Abdomen: gravid, soft, non-tender  She reports occ SOB  Discussed dyspnea of pregnancy  Denies uterine contractions  Denies vaginal bleeding or leaking of fluid  Reports adequate fetal movement of at least 10 movements in 2 hours once daily  Scheduled for ultrasound 09/01/21  Wants to get tdap at next visit  28 wks labs ordered  Reviewed premature labor precautions and fetal kick counts  Advised to continue medications and return in 2 weeks        Current Outpatient Medications   Medication Instructions    aspirin 162 mg, Oral, Daily    metoclopramide (REGLAN) 10 mg, Oral, Every 6 hours PRN    Prenatal Vit-Fe Fumarate-FA (PRENATAL VITAMINS PO) Oral         Pregnancy Problems (from 03/29/21 to present)     Problem Noted Resolved    Chlamydia infection affecting pregnancy in first trimester 4/7/2021 by Melani Mcintyre MD No    Overview Addendum 6/2/2021 11:43 AM by Melani Mcintyre MD     LETICIA collected 04/07/21- +GC and +CT  LETICIA collected 05/05/21- negative           Previous Version

## 2021-08-10 PROBLEM — Z3A.29 29 WEEKS GESTATION OF PREGNANCY: Status: ACTIVE | Noted: 2021-08-10

## 2021-08-10 NOTE — PROGRESS NOTES
[address] VISIT  Name: Tiera Stark  MRN: [id number]  : 2000      ASSESSMENT/PLAN:    Problem List        Other    Depression    Chlamydia infection affecting pregnancy in first trimester    Overview     LETICIA collected 21- +GC and +CT  LETICIA collected 21- negative           Increased nuchal translucency space on fetal ultrasound    29 weeks gestation of pregnancy    Overview     28 week labs pending  Tdap today  F/u in 2 weeks                 SUBJECTIVE 21 y o   29w3d here for PN visit  She denies contractions  She denies leakage of fluid and vaginal bleeding  She endorses good fetal movement  Her pregnancy is complicated by chlamydia infection in pregnancy (LETICIA 21) and increased nuchal translucency space on fetal ultrasound  Patient is doing well today  She plans to get her 28 week labs done today  OBJECTIVE:  Vitals:    21 1335   BP: 118/75   Pulse: 90       Physical Exam  Vitals reviewed  Constitutional:       Appearance: Normal appearance  HENT:      Head: Normocephalic and atraumatic  Eyes:      Extraocular Movements: Extraocular movements intact  Cardiovascular:      Rate and Rhythm: Normal rate  Pulses: Normal pulses  Pulmonary:      Effort: Pulmonary effort is normal       Breath sounds: Normal breath sounds  Abdominal:      Palpations: Abdomen is soft  Comments: Gravid uterus   Musculoskeletal:         General: Normal range of motion  Cervical back: Normal range of motion  Skin:     General: Skin is warm and dry  Neurological:      Mental Status: She is alert     Psychiatric:         Mood and Affect: Mood normal          Behavior: Behavior normal          Fundal height: 28  FHT: 145       Future Appointments   Date Time Provider Kim Majano   2021 11:15 AM Clarice Mcmullen MD [address] [facility]   2021  1:45 PM  [address] [place] [address]   2021  1:15 PM [address] [address] Real [address] 62   9/22/2021 11:15 AM SERG Sue [address] 62   9/29/2021 12:45 PM Isra Walters MD [address] 62   10/6/2021 10:45 AM Chi Elliott MD [address] 62   10/13/2021 12:45 PM Isra Walters MD [address] 62   10/20/2021 11:15 AM Chi Elliott MD [address] 62   10/27/2021 12:45 PM Isra Walters MD [address] Elizabeth Martin MD  OB/GYN PGY-1  8/11/2021  1:50 PM

## 2021-08-10 NOTE — PATIENT INSTRUCTIONS
Pregnancy at 32 to 30 Weeks   AMBULATORY CARE:   What changes are happening to your body: You may notice new symptoms such as shortness of breath, heartburn, or swelling of your ankles and feet  You may also have trouble sleeping or contractions  Seek care immediately if:   · You develop a severe headache that does not go away  · You have new or increased vision changes, such as blurred or spotted vision  · You have new or increased swelling in your face or hands  · You have vaginal spotting or bleeding  · Your water broke or you feel warm water gushing or trickling from your vagina  Contact your healthcare provider if:   · You have more than 5 contractions in 1 hour  · You notice any changes in your baby's movements  · You have abdominal cramps, pressure, or tightening  · You have a change in vaginal discharge  · You have chills or a fever  · You have vaginal itching, burning, or pain  · You have yellow, green, white, or foul-smelling vaginal discharge  · You have pain or burning when you urinate, less urine than usual, or pink or bloody urine  · You have questions or concerns about your condition or care  How to care for yourself at this stage of your pregnancy:   · Eat a variety of healthy foods  Healthy foods include fruits, vegetables, whole-grain breads, low-fat dairy foods, beans, lean meats, and fish  Drink liquids as directed  Ask how much liquid to drink each day and which liquids are best for you  Limit caffeine to less than 200 milligrams each day  Limit your intake of fish to 2 servings each week  Choose fish low in mercury such as canned light tuna, shrimp, salmon, cod, or tilapia  Do not  eat fish high in mercury such as swordfish, tilefish, gato mackerel, and shark  · Manage heartburn  by eating 4 or 5 small meals each day instead of large meals  Avoid spicy food  · Manage swelling  by lying down and putting your feet up           · Take prenatal vitamins as directed  Your need for certain vitamins and minerals, such as folic acid, increases during pregnancy  Prenatal vitamins provide some of the extra vitamins and minerals you need  Prenatal vitamins may also help to decrease the risk of certain birth defects  · Talk to your healthcare provider about exercise  Moderate exercise can help you stay fit  Your healthcare provider will help you plan an exercise program that is safe for you during pregnancy  · Do not smoke  Smoking increases your risk of a miscarriage and other health problems during your pregnancy  Smoking can cause your baby to be born too early or weigh less at birth  Ask your healthcare provider for information if you need help quitting  · Do not drink alcohol  Alcohol passes from your body to your baby through the placenta  It can affect your baby's brain development and cause fetal alcohol syndrome (FAS)  FAS is a group of conditions that causes mental, behavior, and growth problems  · Talk to your healthcare provider before you take any medicines  Many medicines may harm your baby if you take them when you are pregnant  Do not take any medicines, vitamins, herbs, or supplements without first talking to your healthcare provider  Never use illegal or street drugs (such as marijuana or cocaine) while you are pregnant  Safety tips during pregnancy:   · Avoid hot tubs and saunas  Do not use a hot tub or sauna while you are pregnant, especially during your first trimester  Hot tubs and saunas may raise your baby's temperature and increase the risk of birth defects  · Avoid toxoplasmosis  This is an infection caused by eating raw meat or being around infected cat feces  It can cause birth defects, miscarriages, and other problems  Wash your hands after you touch raw meat  Make sure any meat is well-cooked before you eat it  Avoid raw eggs and unpasteurized milk   Use gloves or ask someone else to clean your cat's litter box while you are pregnant  Changes that are happening with your baby:  By 30 weeks, your baby may weigh more than 3 pounds  Your baby may be about 11 inches long from the top of the head to the rump (baby's bottom)  Your baby's eyes open and close now  Your baby's kicks and movements are more forceful at this time  What you need to know about prenatal care: Your healthcare provider will check your blood pressure and weight  You may also need the following:  · Blood tests  may be done to check for anemia or blood type  · A urine test  may also be done to check for sugar and protein  These can be signs of gestational diabetes or infection  Protein in your urine may also be a sign of preeclampsia  Preeclampsia is a condition that can develop during week 20 or later of your pregnancy  It causes high blood pressure, and it can cause problems with your kidneys and other organs  · A Tdap vaccine and flu vaccine  may be recommended by your healthcare provider  · A gestational diabetes screen  will be done using an oral glucose tolerance test (OGTT)  An OGTT starts with a blood sugar level check after you have not eaten for 8 hours  You are then given a glucose drink  Your blood sugar level is checked after 1 hour, 2 hours, and sometimes 3 hours  Healthcare providers look at how much your blood sugar level increases from the first check  · Fundal height  is a measurement of your uterus to check your baby's growth  This number is usually the same as the number of weeks that you have been pregnant  Your healthcare provider may also check your baby's position  · Your baby's heart rate  will be checked  © Copyright [employer] 2021 Information is for End User's use only and may not be sold, redistributed or otherwise used for commercial purposes   All illustrations and images included in CareNotes® are the copyrighted property of [employer]  or Torrey Pugh  The above information is an  only  It is not intended as medical advice for individual conditions or treatments  Talk to your doctor, nurse or pharmacist before following any medical regimen to see if it is safe and effective for you

## 2021-08-11 ENCOUNTER — ROUTINE PRENATAL (OUTPATIENT)
Dept: OBGYN CLINIC | Facility: CLINIC | Age: 21
End: 2021-08-11

## 2021-08-11 ENCOUNTER — APPOINTMENT (OUTPATIENT)
Dept: LAB | Facility: HOSPITAL | Age: 21
End: 2021-08-11
Attending: OBSTETRICS & GYNECOLOGY
Payer: COMMERCIAL

## 2021-08-11 VITALS
SYSTOLIC BLOOD PRESSURE: 118 MMHG | WEIGHT: 140 LBS | DIASTOLIC BLOOD PRESSURE: 75 MMHG | BODY MASS INDEX: 24.8 KG/M2 | HEART RATE: 90 BPM

## 2021-08-11 DIAGNOSIS — Z34.93 PRENATAL CARE IN THIRD TRIMESTER: ICD-10-CM

## 2021-08-11 DIAGNOSIS — A74.9 CHLAMYDIA INFECTION AFFECTING PREGNANCY IN FIRST TRIMESTER: ICD-10-CM

## 2021-08-11 DIAGNOSIS — O98.811 CHLAMYDIA INFECTION AFFECTING PREGNANCY IN FIRST TRIMESTER: ICD-10-CM

## 2021-08-11 DIAGNOSIS — Z34.92 PRENATAL CARE IN SECOND TRIMESTER: ICD-10-CM

## 2021-08-11 DIAGNOSIS — Z3A.29 29 WEEKS GESTATION OF PREGNANCY: Primary | ICD-10-CM

## 2021-08-11 LAB
BASOPHILS # BLD AUTO: 0 THOUSANDS/ΜL (ref 0–0.1)
BASOPHILS NFR BLD AUTO: 0 % (ref 0–1)
EOSINOPHIL # BLD AUTO: 0.1 THOUSAND/ΜL (ref 0–0.4)
EOSINOPHIL NFR BLD AUTO: 1 % (ref 0–6)
ERYTHROCYTE [DISTWIDTH] IN BLOOD BY AUTOMATED COUNT: 12.6 %
GLUCOSE 1H P 50 G GLC PO SERPL-MCNC: 67 MG/DL (ref 40–140)
HCT VFR BLD AUTO: 32.6 % (ref 36–46)
HGB BLD-MCNC: 10.9 G/DL (ref 12–16)
LYMPHOCYTES # BLD AUTO: 1.8 THOUSANDS/ΜL (ref 0.5–4)
LYMPHOCYTES NFR BLD AUTO: 16 % (ref 25–45)
MCH RBC QN AUTO: 30 PG (ref 26–34)
MCHC RBC AUTO-ENTMCNC: 33.3 G/DL (ref 31–36)
MCV RBC AUTO: 90 FL (ref 80–100)
MONOCYTES # BLD AUTO: 1 THOUSAND/ΜL (ref 0.2–0.9)
MONOCYTES NFR BLD AUTO: 9 % (ref 1–10)
NEUTROPHILS # BLD AUTO: 8.7 THOUSANDS/ΜL (ref 1.8–7.8)
NEUTS SEG NFR BLD AUTO: 75 % (ref 45–65)
PLATELET # BLD AUTO: 261 THOUSANDS/UL (ref 150–450)
PMV BLD AUTO: 9 FL (ref 8.9–12.7)
RBC # BLD AUTO: 3.62 MILLION/UL (ref 4–5.2)
WBC # BLD AUTO: 11.6 THOUSAND/UL (ref 4.5–11)

## 2021-08-11 PROCEDURE — 86336 INHIBIN A: CPT

## 2021-08-11 PROCEDURE — 82105 ALPHA-FETOPROTEIN SERUM: CPT

## 2021-08-11 PROCEDURE — 99213 OFFICE O/P EST LOW 20 MIN: CPT | Performed by: OBSTETRICS & GYNECOLOGY

## 2021-08-11 PROCEDURE — 82950 GLUCOSE TEST: CPT

## 2021-08-11 PROCEDURE — 85025 COMPLETE CBC W/AUTO DIFF WBC: CPT

## 2021-08-11 PROCEDURE — 82677 ASSAY OF ESTRIOL: CPT

## 2021-08-11 PROCEDURE — 90715 TDAP VACCINE 7 YRS/> IM: CPT

## 2021-08-11 PROCEDURE — 86592 SYPHILIS TEST NON-TREP QUAL: CPT

## 2021-08-11 PROCEDURE — 90471 IMMUNIZATION ADMIN: CPT

## 2021-08-11 PROCEDURE — 36415 COLL VENOUS BLD VENIPUNCTURE: CPT

## 2021-08-11 PROCEDURE — 84702 CHORIONIC GONADOTROPIN TEST: CPT

## 2021-08-12 LAB — RPR SER QL: NORMAL

## 2021-08-13 ENCOUNTER — TELEPHONE (OUTPATIENT)
Dept: OBGYN CLINIC | Facility: CLINIC | Age: 21
End: 2021-08-13

## 2021-08-13 DIAGNOSIS — A74.9 CHLAMYDIA INFECTION AFFECTING PREGNANCY IN FIRST TRIMESTER: ICD-10-CM

## 2021-08-13 DIAGNOSIS — Z3A.29 29 WEEKS GESTATION OF PREGNANCY: ICD-10-CM

## 2021-08-13 DIAGNOSIS — O98.811 CHLAMYDIA INFECTION AFFECTING PREGNANCY IN FIRST TRIMESTER: ICD-10-CM

## 2021-08-13 NOTE — TELEPHONE ENCOUNTER
----- Message from Hermann Angeles MD sent at 8/12/2021 11:23 AM EDT -----  Rx sent to pharmacy for iron due to anemia  Please inform      TY  ----- Message -----  From: Lab, Background User  Sent: 8/11/2021   4:13 PM EDT  To: Hermann Angeles MD

## 2021-08-17 LAB
2ND TRIMESTER 4 SCREEN SERPL-IMP: NORMAL
2ND TRIMESTER 4 SCREEN SERPL-IMP: NORMAL
AFP ADJ MOM SERPL: NORMAL
AFP SERPL-MCNC: 268.6 NG/ML
AGE AT DELIVERY: 20.9 YR
FET TS 18 RISK FROM MAT AGE: NORMAL
FET TS 21 RISK FROM MAT AGE: 1150
GA METHOD: NORMAL
GA: 29.6 WEEKS
HCG ADJ MOM SERPL: NORMAL
HCG SERPL-ACNC: 5533 MIU/ML
IDDM PATIENT QL: NO
INHIBIN A ADJ MOM SERPL: NORMAL
INHIBIN A SERPL-MCNC: 223.36 PG/ML
KARYOTYP BLD/T: NORMAL
MULTIPLE PREGNANCY: NO
NEURAL TUBE DEFECT RISK FETUS: NORMAL %
SERVICE CMNT-IMP: NORMAL
TS 18 RISK FETUS: NORMAL
TS 21 RISK FETUS: NORMAL
U ESTRIOL ADJ MOM SERPL: NORMAL
U ESTRIOL SERPL-MCNC: 5.67 NG/ML

## 2021-08-25 ENCOUNTER — ROUTINE PRENATAL (OUTPATIENT)
Dept: OBGYN CLINIC | Facility: CLINIC | Age: 21
End: 2021-08-25

## 2021-08-25 VITALS
DIASTOLIC BLOOD PRESSURE: 75 MMHG | BODY MASS INDEX: 24.98 KG/M2 | HEART RATE: 99 BPM | SYSTOLIC BLOOD PRESSURE: 112 MMHG | WEIGHT: 141 LBS

## 2021-08-25 DIAGNOSIS — Z3A.31 31 WEEKS GESTATION OF PREGNANCY: ICD-10-CM

## 2021-08-25 DIAGNOSIS — Z34.93 PRENATAL CARE IN THIRD TRIMESTER: Primary | ICD-10-CM

## 2021-08-25 PROCEDURE — 99215 OFFICE O/P EST HI 40 MIN: CPT | Performed by: NURSE PRACTITIONER

## 2021-08-25 NOTE — PATIENT INSTRUCTIONS
The Third Trimester  (28-42 weeks)  YOUR BABY   * your baby sucks its thumb now! * your baby can hear voices and respond to touch   so talk to him or her!!   * your babys brain grows and develops most in the last 2 months of pregnancy   * babys head and bones are soft and flexible so they can fit through the birth canal   * babys movements change towards the end of pregnancy because there is less room for kicking and stretching in your belly   * babys lungs are not fully developed and completely ready to breathe on their own until the last 3-4 weeks before your due date    YOUR BODY   * your belly is growing a lot now   * it may become more difficult to sleep well at night or to be as active as you usually are   * you may sweat more than usual   * you will become more off-balancebe careful not to fall!!   * you may develop hemorrhoids (which can be painful and make it difficult to sit down)   * the last two months of pregnancy can become very uncomfortablewith backaches, headaches, and heartburn   * you can start to have contractions  as long as they are irregular and less than 5 per hour, this is a normal part of your body getting ready to have a baby   * your cervix may start to thin out and open upto get ready for delivery   * you may find yourself needing to pee very often  because baby is pressing on your bladder so much   * you may get out of breathe more quickly than usual      FETAL KICK COUNTS    In the third trimester (after 28 weeks gestation) you should be performing fetal kick counts every day  Your baby should move at least 10 times in 2 hours during an active time, once a day  Choose atime of day when your baby is most active  Try to do this around the same time each day  Get into a comfortable position and then write down the time your baby first moves  Count each movement until the baby moves 10 times  These movements include kicks, punches, nudges, flutters, or rolls    This can take anywhere from 5 minutes to 2 hours  Write down the time you feel the baby's 10th movement  If 2 hours has passed and your baby has not moved at least 10 times, you should CALL THE OFFICE RIGHT AWAY  [phone]  PREMATURE LABOR     When to call [phone]:  * I need to call immediately if I have even a small amount of LIQUID leaking from my vagina, with or without contractions  * I need to call if I am BLEEDING from my vagina  * I need to call if I am feeling CRAMPING that continues after drinking 2-3 glasses of water and lying down on my side for one hour and that feels like I am having a period  * I need to call if I feel CONTRACTIONS  more than 4 times in an hour that feels like the baby is balling up even after I try drinking 2-3 glasses of water and lying down on my side for an hour  * I need to call if I notice a change in my vaginal DISCHARGE  * I need to call if I am feeling PELVIC PRESSURE  that feels like the baby is pushing down into my vagina and lasts more than an hour  * I need to call if I have LOW BACKACHE which is new and near my tailbone  It may either come and go several times during an hour or stay there constantly  PRE-ECLAMPSIA     What is it? Pre-eclampsia is a serious disease that can occur during pregnancy related to high blood pressures  It can happen to any woman  Why should I care? Women who develop pre-eclampsia have serious risks which can include seizures, stroke, organ damage, premature birth of their baby  In the very worst cases, it can cause death of the mother and/or their baby  What should I pay attention to?    Signs and symptoms of pre-eclampsia can include:   * Severe swelling of face or hands    * A headache that will not go away even after you have taken Tylenol   * Seeing spots or changes in eyesight    * Pain in the upper abdomen or shoulder    * New nausea and vomiting (in the second half of pregnancy)    * Sudden weight gain    * Difficulty breathing     What should I do? If you experience any of the above symptoms of pre-eclampsia, contact your OB provider  Finding pre-eclampsia early is important for you and your baby  Call us at [phone]  [facility]irst[facility] BREASTFEEDING     BENEFITS FOR BABIES   * stronger immune systems (less allergies, eczema, asthma, and childhood cancers)   * less diarrhea and constipation or other GI diseases   * fewer colds and ear infections   * better vision and teeth (fewer cavities)   * improves IQ   * lower rates of diabetes and obesity in childhood     BENEFITS FOR MOMS   * promotes faster weight loss after delivery   * lower risk for postpartum depression   * lower risk for breast, uterine, and ovarian cancers   * lower risk for osteoporosis developing with age   * easier than formula - is always right with you, clean, and the right temperature   * less expensive than formulaits FREE !!!!     KEYS TO SUCCESSFUL BREASTFEEDING   * keep baby skin-to-skin until after first feeding event   * keep baby in your room with you during your hospital stay after delivery   * avoid any bottle feedings (unless medically necessary)   * limit the use of pacifiers and swaddling   * ask for help if you are having any issueslactation consultants (who specialize in breastfeeding) are available to help you   * a healthy diet for momeating a variety of foods and portions in moderation    THINGS YOU SHOULD KNOW ABOUT BREASTFEEDING   * most medications are considered compatible with breastfeeding by the [address] [place] [address] [employer] of Pediatrics, but you should check with your health care provider or lactation consultant prior to taking a new medicationjust to be sure it is safe   * alcohol (beer, wine, liquor) can be passed from mother to baby through breast milkan occasional, social drink is deemed acceptable by the American [employer] of [place] [address]   more than that should be avoided   * breastfeeding is NOT an effective method of birth control   * nicotine (in cigarettes) can pass from mother to baby through breast milk   however, for mothers who smoke, it is still healthier to breastfeed than use formula   * caffeine should be limited to 1-3 cups per dayincludes coffee, soda, energy drinks         PERINEAL / VAGINAL MASSAGE    What can I do now to decrease my chances of tearing during delivery? Massaging around the vaginal opening by you (or your partner), either antepartum (before birth) or during the second stage of labor, can reduce the likelihood of perineal tearing during childbirth  Likewise, the use of warm packs held on the perineum during the pushing stage of labor can reduce the severity of your tear  This will happen during the pushing stage of labor  At home, you can also help reduce the chances of injury that may occur during the birth of your child through perineal massage  When should I do this? Starting around or shortly after 34 weeks of pregnancy, you or your partner should provide 5-10 minutes of vaginal massage 1-4 times per week  How? Use either almond, coconut, or olive oil and water mixture on 1 or 2 fingers (depending on comfort)  Insert finger(s) 3-5cm into the vagina  Apply sweeping downward/sideward pressure from 3 to 9 o'clock for 5-10 minutes, 1-4 times per week  WARNING SIGNS DURING PREGNANCY  Call our office at [phone] if you experience any of the followin  Vaginal bleeding  2  Sharp abdominal pain that does not go away  3  Fever (more than 100 4 and is not relieved by Tylenol)  4  Persistent vomiting lasting greater than 24 hours  5  Chest pain   6  Pain or burning when you urinate  7  Severe headache that doesn't resolve with Tylenol  8  Blurred vision or seeing spots in your vision  9  Sudden swelling of your face or hands  10  Redness, swelling or pain in a leg  11  A sudden weight gain in just a few days  12   Decrease in your baby's movement (after 28 weeks or the 6th month of pregnancy)  13  A loss of watery fluid from your vagina - can be a gush, a trickle or continuous wetness  14  After 20 weeks of pregnancy, rhythmic cramping (greater than 4 per hour) or menstrual like low/pelvic pain          VACCINES IN PREGNANCY    TDAP  Whopping cough (or pertusSsis) can be serious for anyone, but for your , it can be life-threatning  Up to 20 babies die each year in the [place]  Due to whopping cough  About half of babies younger than 3year old who get whopping cough need treatment in the hospital   The younger the baby is when he or she gets whopping cough, the more likely he or she will need to be treated in a hospital   When you receive the whopping cough vaccine (Tdap) during your pregnancy, your body will create protective antibodies and pass some of them to your baby before birth  These antibodies can help protect your baby from getting whopping cough until they are old enough to be vaccinated themselves (usually around 7 months of age)  INFLUENZA  Changes in your immune, heart, and lung functions during pregnancy make you more likely to get seriously ill from the flu  Catching the flu also increases your chances for serious problems for your developing baby, including premature labor and delivery  It is recommended that all women who are pregnant during flu season should receive an influenza vaccine  Coronavirus (PLFDS-48) pregnancy FAQs: Medical experts answer your questions  From [employer] cy  com/0_coronavirus-covid-19-pregnancy-faq-medical-kqvntsq-zorygp-tr_15779800  bc (courtesy of [facility])  As of 3/18/20  By Marcelino Jolly 39  Medically reviewed by Society for Maternal-Fetal Medicine       We asked parents-to-be to send us their most pressing questions about coronavirus (COVID-19)  Among them: Is it still safe to deliver in a hospital? What if my ob-gyn has the virus?  We sent those questions to the top docs at the Society for Maternal-Fetal Medicine  Here are their answers  The coronavirus (COVID-19) pandemic has been declared a national emergency in the [facility] by [employer]  Moms-to-be like you are concerned about everything from getting medicines to managing disruptions at work  But above and beyond any worry about lifestyle changes is a focus on your health and the impact of COVID-19 on your pregnancy  We asked obstetrics doctors who handle the most complicated pregnancy issues to answer your questions about the coronavirus  Here are their responses, provided by Dr Cheyenne Palafox and her colleagues at the Society for [place] [employer]  Am I at more risk for COVID-19 if I'm pregnant? We don't know  Pregnancy does change your immune system in ways that might make you more susceptible to viral respiratory infections like COVID-19  If you become infected, you might also be at higher risk for more severe illness compared to the general population  Although this does not appear to be the case with COVID-19, based on limited data so far, a higher risk has been true for pregnant women with other coronavirus infections (SARS-CoV and MERS-CoV) and other viral respiratory infections, such as flu  It's important to take preventive actions to avoid infection, such as washing your hands often and avoiding people who are sick  How might coronavirus affect my pregnancy? Again, we don't know  Women with other coronavirus infections (such as SARS-CoV) did not have miscarriage or stillbirth at higher rates than the general population  We know that having other respiratory viral infections during pregnancy, such as flu, has been associated with problems like low birth weight and  birth  Also, having a high fever early in pregnancy may increase the risk of certain birth defects  Could I transmit coronavirus to my baby during pregnancy or delivery?   We don't know whether you could transmit COVID-19 to your baby before or during delivery  However, among the few case studies of infants born to mothers with COVID-23 published in peer-reviewed literature, none of the infants tested positive for the virus  Also, there was no virus detected in samples of amniotic fluid or breast milk  There have been a few reports of newborns as young as a few days old with infection, suggesting that a mother can transmit the infection to her infant through close contact after delivery  There have been no reports of mother-to-baby transmission for other coronaviruses (MERS-CoV and SARS-CoV), although only limited information is available  Is it safe for me to deliver at a hospital where there have been COVID-19 cases? Yes  We know that COVID-19 is a very scary virus  The good news is that hospitals are taking great precautions to keep patients and healthcare providers safe  When a patient is even suspected to have COVID-19, they are placed in a negative pressure room  (Think of these rooms as vacuums that suck and filter the air so it's safe for the other people in the hospital)  This makes it possible for you to deliver at the hospital without putting you or your baby at risk  Hospitals are also implementing stricter visiting policies to keep patients safe  It's worth calling your hospital to check if there are any new regulations to be aware of  What plans should I make now in case the hospital system is overwhelmed when it's time for me to deliver? This is a great question to talk with your doctor or midwife about when you're 34 to 36 weeks pregnant  Every hospital is making different plans for dealing with this scenario  I work in healthcare  Should I ask my doctor to excuse me from work until the baby is born? What if I work in a school, the travel [address], or some other high-risk setting?   Healthcare facilities should take care to limit the exposure of pregnant employees to patients with confirmed or suspected COVID-19, just as they would with other infectious cases  If you continue working, be sure to follow the CDC's risk assessment and infection control guidelines  If you work in a school, travel [employer], or other high-risk setting, talk with your employer about what it's doing to protect employees and minimize infection risks  Wash your hands often  What if my OB gets COVID-19? If your doctor or midwife tests positive for COVID-19, they will need to be quarantined until they recover and are no longer at risk of transmitting the virus  In this case, you'll be assigned to another OB in your doctor's practice (or you may choose another practitioner yourself)  Ask your new OB or your doctor's office if you should self-quarantine or be tested for the virus  (It will depend on when you last saw your provider and when that person tested positive )    Should we hold off on trying to conceive because of COVID-19? At this time, there's no reason to hold off on trying to get pregnant, but the data we have is really limited  For example, we don't think the virus causes birth defects or increases your risk of miscarriage  But we don't know whether you could transmit COVID-19 to your baby before or during delivery  We also don't know if the virus lives in semen or can be sexually transmitted  We have a babymoon scheduled in the next few months - should we cancel? If you're planning to travel internationally or on a cruise, you should strongly consider canceling  At this time, the virus has reached more than 140 countries, and there are travel bans to [place], most of [place], and [place]  Places where large numbers of people gather are at highest risk, especially airports and cruise ships  If you're planning travel in the [place] , note that any travel setting increases your risk of exposure, and there may be travel bans even in [place] by the time you're scheduled to go   Even if you're state is not blocked, you'll definitely want to avoid traveling to communities where the virus is spreading  To find out where these places are, check The [place] Times map based on CDC data  For the most current advice to help you avoid exposure, check the CDC's COVID-19 travel page  Will the hospital separate me from my  and keep the baby in quarantine? If you test positive for COVID-19 or have been exposed but have no symptoms, the CDC, [employer] of Obstetricians and Gynecologists, and the Society for [employer] 250 all recommend that you be  from your baby to decrease the risk of transmission to the baby  This would last until you are no longer at risk of transmitting the virus  If you do not have COVID-19 and have not been exposed to the virus, the hospital will not separate you from your   My hospital is restricting visitors and only allowing one support person  If my support person leaves after the delivery, will they be allowed to come back? Every hospital has different policies  Contact your hospital or labor and delivery unit a week or so before delivery to get the most up-to-date restrictions  In general, if your support person needs to leave, they would be allowed back unless they knew they were exposed to COVID-19 after leaving your company  [facility] understands that the coronavirus (COVID-19) pandemic is an evolving story and that your questions will change over time  We'll continue asking moms and dads in our Community what they want to know, and we'll get the answers from experts to keep them - and you - informed and supported  My mom was planning to fly here to help me care for my new baby after delivery  Should I tell her not to come? Yes  If your mom is over 61 or has any serious chronic medical conditions (such as heart disease, lung disease, or diabetes), she is at higher risk of serious illness from COVID-19 and should avoid air travel   And remember that any travel setting increases a person's risk of exposure  So, it may be risky to have her around the baby after she has been traveling  For the most current advice on traveling, check the CDC's COVID-19 travel page  [facility] understands that the coronavirus pandemic is an evolving story and that your questions will change over time  We'll continue asking moms and dads in our Community what they want to know, and we'll get the answers from experts to keep them - and you - informed and supported      Return in 2 wweeks

## 2021-08-25 NOTE — PROGRESS NOTES
Assessment & Plan  21 y o   at 31w4d presenting for routine prenatal visit  Explained WNL 28 week labs  WNL respiratory effort, negative cough or SOB    Problem List Items Addressed This Visit        Other    31 weeks gestation of pregnancy      Other Visit Diagnoses     Prenatal care in third trimester    -  Primary        ____________________________________________________________  Subjective  She is without complaint  She denies contractions, loss of fluid, or vaginal bleeding  She feels regular fetal movements  FHT  135  Objective  /75   Pulse 99   Wt 64 kg (141 lb)   LMP 2021 (Exact Date)   BMI 24 98 kg/m²          Patient's Active Problem List  Patient Active Problem List   Diagnosis    Depression    Chlamydia infection affecting pregnancy in first trimester    Increased nuchal translucency space on fetal ultrasound    31 weeks gestation of pregnancy     Plan  To call with vaginal bleeding, loss of fluid, regular contractions, decreased fetal movement, other needs or concerns  Return in 2 weeks  Pt verbalized understanding of all discussed

## 2021-09-07 PROBLEM — Z3A.33 33 WEEKS GESTATION OF PREGNANCY: Status: ACTIVE | Noted: 2021-08-10

## 2021-09-07 NOTE — PROGRESS NOTES
[address] VISIT  Name: Loretta Manley  MRN: [id number]  : 2000      ASSESSMENT/PLAN:    Medical Problems     Problem List     Depression    Chlamydia infection affecting pregnancy in first trimester    Overview Addendum 2021 11:43 AM by Latricia Mejía MD     LETICIA collected 21- +GC and +CT  LETICIA collected 21- negative           Increased nuchal translucency space on fetal ultrasound    33 weeks gestation of pregnancy    Overview Signed 2021  1:47 PM by Meghana Quigley MD     28 week labs pending  Tdap today  F/u in 2 weeks                     SUBJECTIVE 21 y o   33w4d here for PN visit  She denies contractions  She denies leakage of fluid and vaginal bleeding  She endorses good fetal movement  Her pregnancy is complicated by depression and chlamydia infection in pregnancy (LETICIA on 21)  She declines the COVID vaccine  OBJECTIVE:  Vitals:    21 1300   BP: 105/70   Pulse: 80       Physical Exam  Vitals reviewed  Constitutional:       Appearance: Normal appearance  HENT:      Head: Normocephalic and atraumatic  Eyes:      Extraocular Movements: Extraocular movements intact  Cardiovascular:      Rate and Rhythm: Normal rate  Pulses: Normal pulses  Pulmonary:      Effort: Pulmonary effort is normal       Breath sounds: Normal breath sounds  Abdominal:      Palpations: Abdomen is soft  Comments: Gravid uterus   Musculoskeletal:         General: Normal range of motion  Cervical back: Normal range of motion  Skin:     General: Skin is warm and dry  Neurological:      Mental Status: She is alert     Psychiatric:         Mood and Affect: Mood normal          Behavior: Behavior normal          Fundal height: 32  FHT: 150       Future Appointments   Date Time Provider Kim Majano   2021  3:15 PM  [facility] [address]   2021 11:15 AM J Carlos Richards, [address] [place] [address] 9/29/2021 12:45 PM Elizabeth Mcintyre MD [place] [address]   10/6/2021 10:45 AM Yanique Cormier MD [address]   10/13/2021 12:45 PM Elizabeth Mcintyre MD [address]   10/20/2021 11:15 AM Yanique Cormier MD [place] [address]   10/27/2021 12:45 PM Elizabeth Mcintyre MD [address] Elizaebth Martin MD  OB/GYN PGY-1  9/8/2021  1:36 PM

## 2021-09-07 NOTE — PATIENT INSTRUCTIONS
Pregnancy at 31 to 34 Weeks   AMBULATORY CARE:   What changes are happening to your body: You may continue to have symptoms such as shortness of breath, heartburn, contractions, or swelling of your ankles and feet  You may be gaining about 1 pound a week now  Seek care immediately if:   · You develop a severe headache that does not go away  · You have new or increased vision changes, such as blurred or spotted vision  · You have new or increased swelling in your face or hands  · You have vaginal spotting or bleeding  · Your water broke or you feel warm water gushing or trickling from your vagina  Contact your healthcare provider if:   · You have more than 5 contractions in 1 hour  · You notice any changes in your baby's movements  · You have abdominal cramps, pressure, or tightening  · You have a change in vaginal discharge  · You have chills or a fever  · You have vaginal itching, burning, or pain  · You have yellow, green, white, or foul-smelling vaginal discharge  · You have pain or burning when you urinate, less urine than usual, or pink or bloody urine  · You have questions or concerns about your condition or care  How to care for yourself at this stage of your pregnancy:   · Eat a variety of healthy foods  Healthy foods include fruits, vegetables, whole-grain breads, low-fat dairy foods, beans, lean meats, and fish  Drink liquids as directed  Ask how much liquid to drink each day and which liquids are best for you  Limit caffeine to less than 200 milligrams each day  Limit your intake of fish to 2 servings each week  Choose fish low in mercury such as canned light tuna, shrimp, salmon, cod, or tilapia  Do not  eat fish high in mercury such as swordfish, tilefish, gato mackerel, and shark  · Manage heartburn  by eating 4 or 5 small meals each day instead of large meals  Avoid spicy food  · Manage swelling  by lying down and putting your feet up  · Take prenatal vitamins as directed  Your need for certain vitamins and minerals, such as folic acid, increases during pregnancy  Prenatal vitamins provide some of the extra vitamins and minerals you need  Prenatal vitamins may also help to decrease the risk of certain birth defects  · Talk to your healthcare provider about exercise  Moderate exercise can help you stay fit  Your healthcare provider will help you plan an exercise program that is safe for you during pregnancy  · Do not smoke  Smoking increases your risk of a miscarriage and other health problems during your pregnancy  Smoking can cause your baby to be born too early or weigh less at birth  Ask your healthcare provider for information if you need help quitting  · Do not drink alcohol  Alcohol passes from your body to your baby through the placenta  It can affect your baby's brain development and cause fetal alcohol syndrome (FAS)  FAS is a group of conditions that causes mental, behavior, and growth problems  · Talk to your healthcare provider before you take any medicines  Many medicines may harm your baby if you take them when you are pregnant  Do not take any medicines, vitamins, herbs, or supplements without first talking to your healthcare provider  Never use illegal or street drugs (such as marijuana or cocaine) while you are pregnant  Safety tips during pregnancy:   · Avoid hot tubs and saunas  Do not use a hot tub or sauna while you are pregnant, especially during your first trimester  Hot tubs and saunas may raise your baby's temperature and increase the risk of birth defects  · Avoid toxoplasmosis  This is an infection caused by eating raw meat or being around infected cat feces  It can cause birth defects, miscarriages, and other problems  Wash your hands after you touch raw meat  Make sure any meat is well-cooked before you eat it  Avoid raw eggs and unpasteurized milk   Use gloves or ask someone else to clean your cat's litter box while you are pregnant  Changes that are happening with your baby:  By 34 weeks, your baby may weigh more than 5 pounds  Your baby will be about 12 ½ inches long from the top of the head to the rump (baby's bottom)  Your baby is gaining about ½ pound a week  Your baby's eyes open and close now  Your baby's kicks and movements are more forceful at this time  What you need to know about prenatal care: Your healthcare provider will check your blood pressure and weight  You may also need the following:  · A urine test  may also be done to check for sugar and protein  These can be signs of gestational diabetes or infection  Protein in your urine may also be a sign of preeclampsia  Preeclampsia is a condition that can develop during week 20 or later of your pregnancy  It causes high blood pressure, and it can cause problems with your kidneys and other organs  · A Tdap vaccine  may be recommended by your healthcare provider  · Fundal height  is a measurement of your uterus to check your baby's growth  This number is usually the same as the number of weeks that you have been pregnant  Your healthcare provider may also check your baby's position  · Your baby's heart rate  will be checked  © Copyright [employer] 2021 Information is for End User's use only and may not be sold, redistributed or otherwise used for commercial purposes  All illustrations and images included in CareNotes® are the copyrighted property of [employer]  or [employer]   The above information is an  only  It is not intended as medical advice for individual conditions or treatments  Talk to your doctor, nurse or pharmacist before following any medical regimen to see if it is safe and effective for you

## 2021-09-08 ENCOUNTER — ROUTINE PRENATAL (OUTPATIENT)
Dept: OBGYN CLINIC | Facility: CLINIC | Age: 21
End: 2021-09-08

## 2021-09-08 VITALS
SYSTOLIC BLOOD PRESSURE: 105 MMHG | BODY MASS INDEX: 25.9 KG/M2 | HEART RATE: 80 BPM | WEIGHT: 146.2 LBS | DIASTOLIC BLOOD PRESSURE: 70 MMHG

## 2021-09-08 DIAGNOSIS — Z3A.33 33 WEEKS GESTATION OF PREGNANCY: Primary | ICD-10-CM

## 2021-09-08 PROCEDURE — 99213 OFFICE O/P EST LOW 20 MIN: CPT | Performed by: OBSTETRICS & GYNECOLOGY

## 2021-09-14 ENCOUNTER — ULTRASOUND (OUTPATIENT)
Dept: PERINATAL CARE | Facility: OTHER | Age: 21
End: 2021-09-14
Payer: COMMERCIAL

## 2021-09-14 VITALS
WEIGHT: 150 LBS | HEART RATE: 78 BPM | BODY MASS INDEX: 26.58 KG/M2 | DIASTOLIC BLOOD PRESSURE: 69 MMHG | SYSTOLIC BLOOD PRESSURE: 111 MMHG | HEIGHT: 63 IN

## 2021-09-14 DIAGNOSIS — Z36.89 ENCOUNTER FOR ULTRASOUND TO CHECK FETAL GROWTH: Primary | ICD-10-CM

## 2021-09-14 PROCEDURE — 76816 OB US FOLLOW-UP PER FETUS: CPT | Performed by: OBSTETRICS & GYNECOLOGY

## 2021-09-16 NOTE — PROGRESS NOTES
[address]: Ms Marley Lambert was seen today for fetal growth assessment ultrasound  See ultrasound report under "OB Procedures" tab  Please don't hesitate to contact our office with any concerns or questions    Talon Gonzalez MD

## 2021-09-22 ENCOUNTER — ROUTINE PRENATAL (OUTPATIENT)
Dept: OBGYN CLINIC | Facility: CLINIC | Age: 21
End: 2021-09-22

## 2021-09-22 VITALS
BODY MASS INDEX: 26.39 KG/M2 | WEIGHT: 149 LBS | DIASTOLIC BLOOD PRESSURE: 68 MMHG | SYSTOLIC BLOOD PRESSURE: 104 MMHG | HEART RATE: 89 BPM

## 2021-09-22 DIAGNOSIS — Z3A.35 35 WEEKS GESTATION OF PREGNANCY: ICD-10-CM

## 2021-09-22 DIAGNOSIS — Z34.93 PRENATAL CARE IN THIRD TRIMESTER: Primary | ICD-10-CM

## 2021-09-22 PROCEDURE — 99215 OFFICE O/P EST HI 40 MIN: CPT | Performed by: NURSE PRACTITIONER

## 2021-09-22 NOTE — PROGRESS NOTES
Alex Mcfarlane presents today for routine OB visit at 35w4d  Blood Pressure: 104/68  Wt=67 6 kg (149 lb); Body mass index is 26 39 kg/m² ; TWG=12 7 kg (28 lb)  Fetal Heart Rate: 134; Fundal Height (cm): 33 cm  Abdomen: gravid, soft, non-tender  She reports stopped taking LDASA tx 1-2 months ago on her own  Also stopped her ferrous sulfate as she thought she only had to take it for 1 week  Advised to resume it now  Reports rare mild uterine contractions  Denies vaginal bleeding or leaking of fluid  Reports adequate fetal movement of at least 10 movements in 2 hours once daily  Reviewed premature labor precautions and fetal kick counts  Advised to continue medications and return in 1 week        Current Outpatient Medications   Medication Instructions    ferrous sulfate 324 mg, Oral, Daily before breakfast    Prenatal Vit-Fe Fumarate-FA (PRENATAL VITAMINS PO) Oral

## 2021-09-22 NOTE — PATIENT INSTRUCTIONS
The Third Trimester  (28-42 weeks)  YOUR BABY   * your baby sucks its thumb now! * your baby can hear voices and respond to touch   so talk to him or her!!   * your babys brain grows and develops most in the last 2 months of pregnancy   * babys head and bones are soft and flexible so they can fit through the birth canal   * babys movements change towards the end of pregnancy because there is less room for kicking and stretching in your belly   * babys lungs are not fully developed and completely ready to breathe on their own until the last 3-4 weeks before your due date    YOUR BODY   * your belly is growing a lot now   * it may become more difficult to sleep well at night or to be as active as you usually are   * you may sweat more than usual   * you will become more off-balancebe careful not to fall!!   * you may develop hemorrhoids (which can be painful and make it difficult to sit down)   * the last two months of pregnancy can become very uncomfortablewith backaches, headaches, and heartburn   * you can start to have contractions  as long as they are irregular and less than 5 per hour, this is a normal part of your body getting ready to have a baby   * your cervix may start to thin out and open upto get ready for delivery   * you may find yourself needing to pee very often  because baby is pressing on your bladder so much   * you may get out of breathe more quickly than usual      FETAL KICK COUNTS    In the third trimester (after 28 weeks gestation) you should be performing fetal kick counts every day  Your baby should move at least 10 times in 2 hours during an active time, once a day  Choose atime of day when your baby is most active  Try to do this around the same time each day  Get into a comfortable position and then write down the time your baby first moves  Count each movement until the baby moves 10 times  These movements include kicks, punches, nudges, flutters, or rolls    This can take anywhere from 5 minutes to 2 hours  Write down the time you feel the baby's 10th movement  If 2 hours has passed and your baby has not moved at least 10 times, you should CALL THE OFFICE RIGHT AWAY  [phone]  PREMATURE LABOR     When to call [phone]:  * I need to call immediately if I have even a small amount of LIQUID leaking from my vagina, with or without contractions  * I need to call if I am BLEEDING from my vagina  * I need to call if I am feeling CRAMPING that continues after drinking 2-3 glasses of water and lying down on my side for one hour and that feels like I am having a period  * I need to call if I feel CONTRACTIONS  more than 4 times in an hour that feels like the baby is balling up even after I try drinking 2-3 glasses of water and lying down on my side for an hour  * I need to call if I notice a change in my vaginal DISCHARGE  * I need to call if I am feeling PELVIC PRESSURE  that feels like the baby is pushing down into my vagina and lasts more than an hour  * I need to call if I have LOW BACKACHE which is new and near my tailbone  It may either come and go several times during an hour or stay there constantly  PRE-ECLAMPSIA     What is it? Pre-eclampsia is a serious disease that can occur during pregnancy related to high blood pressures  It can happen to any woman  Why should I care? Women who develop pre-eclampsia have serious risks which can include seizures, stroke, organ damage, premature birth of their baby  In the very worst cases, it can cause death of the mother and/or their baby  What should I pay attention to?    Signs and symptoms of pre-eclampsia can include:   * Severe swelling of face or hands    * A headache that will not go away even after you have taken Tylenol   * Seeing spots or changes in eyesight    * Pain in the upper abdomen or shoulder    * New nausea and vomiting (in the second half of pregnancy)    * Sudden weight gain    * Difficulty breathing     What should I do? If you experience any of the above symptoms of pre-eclampsia, contact your OB provider  Finding pre-eclampsia early is important for you and your baby  Call us at [phone]  [facility] BREASTFEEDING     BENEFITS FOR BABIES   * stronger immune systems (less allergies, eczema, asthma, and childhood cancers)   * less diarrhea and constipation or other GI diseases   * fewer colds and ear infections   * better vision and teeth (fewer cavities)   * improves IQ   * lower rates of diabetes and obesity in childhood     BENEFITS FOR MOMS   * promotes faster weight loss after delivery   * lower risk for postpartum depression   * lower risk for breast, uterine, and ovarian cancers   * lower risk for osteoporosis developing with age   * easier than formula - is always right with you, clean, and the right temperature   * less expensive than formulaits FREE !!!!     KEYS TO SUCCESSFUL BREASTFEEDING   * keep baby skin-to-skin until after first feeding event   * keep baby in your room with you during your hospital stay after delivery   * avoid any bottle feedings (unless medically necessary)   * limit the use of pacifiers and swaddling   * ask for help if you are having any issueslactation consultants (who specialize in breastfeeding) are available to help you   * a healthy diet for momeating a variety of foods and portions in moderation    THINGS YOU SHOULD KNOW ABOUT BREASTFEEDING   * most medications are considered compatible with breastfeeding by the [address] [place] [address] [employer], but you should check with your health care provider or lactation consultant prior to taking a new medicationjust to be sure it is safe   * alcohol (beer, wine, liquor) can be passed from mother to baby through breast milkan occasional, social drink is deemed acceptable by the [employer] [place] [employer]   more than that should be avoided   * breastfeeding is NOT an effective method of birth control   * nicotine (in cigarettes) can pass from mother to baby through breast milk   however, for mothers who smoke, it is still healthier to breastfeed than use formula   * caffeine should be limited to 1-3 cups per dayincludes coffee, soda, energy drinks         PERINEAL / VAGINAL MASSAGE    What can I do now to decrease my chances of tearing during delivery? Massaging around the vaginal opening by you (or your partner), either antepartum (before birth) or during the second stage of labor, can reduce the likelihood of perineal tearing during childbirth  Likewise, the use of warm packs held on the perineum during the pushing stage of labor can reduce the severity of your tear  This will happen during the pushing stage of labor  At home, you can also help reduce the chances of injury that may occur during the birth of your child through perineal massage  When should I do this? Starting around or shortly after 34 weeks of pregnancy, you or your partner should provide 5-10 minutes of vaginal massage 1-4 times per week  How? Use either almond, coconut, or olive oil and water mixture on 1 or 2 fingers (depending on comfort)  Insert finger(s) 3-5cm into the vagina  Apply sweeping downward/sideward pressure from 3 to 9 o'clock for 5-10 minutes, 1-4 times per week  WARNING SIGNS DURING PREGNANCY  Call our office at [phone] if you experience any of the followin  Vaginal bleeding  2  Sharp abdominal pain that does not go away  3  Fever (more than 100 4 and is not relieved by Tylenol)  4  Persistent vomiting lasting greater than 24 hours  5  Chest pain   6  Pain or burning when you urinate  7  Severe headache that doesn't resolve with Tylenol  8  Blurred vision or seeing spots in your vision  9  Sudden swelling of your face or hands  10  Redness, swelling or pain in a leg  11  A sudden weight gain in just a few days  12   Decrease in your baby's movement (after 28 weeks or the 6th month of pregnancy)  13  A loss of watery fluid from your vagina - can be a gush, a trickle or continuous wetness  14  After 20 weeks of pregnancy, rhythmic cramping (greater than 4 per hour) or menstrual like low/pelvic pain          VACCINES IN PREGNANCY    TDAP  Whopping cough (or pertusSsis) can be serious for anyone, but for your , it can be life-threatning  Up to 20 babies die each year in the [place]  Due to whopping cough  About half of babies younger than 3year old who get whopping cough need treatment in the hospital   The younger the baby is when he or she gets whopping cough, the more likely he or she will need to be treated in a hospital   When you receive the whopping cough vaccine (Tdap) during your pregnancy, your body will create protective antibodies and pass some of them to your baby before birth  These antibodies can help protect your baby from getting whopping cough until they are old enough to be vaccinated themselves (usually around 7 months of age)  INFLUENZA  Changes in your immune, heart, and lung functions during pregnancy make you more likely to get seriously ill from the flu  Catching the flu also increases your chances for serious problems for your developing baby, including premature labor and delivery  It is recommended that all women who are pregnant during flu season should receive an influenza vaccine

## 2021-09-27 PROBLEM — Z3A.36 36 WEEKS GESTATION OF PREGNANCY: Status: ACTIVE | Noted: 2021-08-10

## 2021-09-27 NOTE — PATIENT INSTRUCTIONS
Pregnancy at 28 to 38 Weeks   AMBULATORY CARE:   What changes are happening to your body: You are considered full term at the beginning of 37 weeks  Your breathing may be easier if your baby has moved down into a head-down position  You may need to urinate more often because the baby may be pressing on your bladder  You may also feel more discomfort and get tired easily  Seek care immediately if:   · You develop a severe headache that does not go away  · You have new or increased vision changes, such as blurred or spotted vision  · You have new or increased swelling in your face or hands  · You have vaginal spotting or bleeding  · Your water broke or you feel warm water gushing or trickling from your vagina  Contact your healthcare provider if:   · You have more than 5 contractions in 1 hour  · You notice any changes in your baby's movements  · You have abdominal cramps, pressure, or tightening  · You have a change in vaginal discharge  · You have chills or a fever  · You have vaginal itching, burning, or pain  · You have yellow, green, white, or foul-smelling vaginal discharge  · You have pain or burning when you urinate, less urine than usual, or pink or bloody urine  · You have questions or concerns about your condition or care  How to care for yourself at this stage of your pregnancy:   · Eat a variety of healthy foods  Healthy foods include fruits, vegetables, whole-grain breads, low-fat dairy foods, beans, lean meats, and fish  Drink liquids as directed  Ask how much liquid to drink each day and which liquids are best for you  Limit caffeine to less than 200 milligrams each day  Limit your intake of fish to 2 servings each week  Choose fish low in mercury such as canned light tuna, shrimp, salmon, cod, or tilapia  Do not  eat fish high in mercury such as swordfish, tilefish, gato mackerel, and shark  · Take prenatal vitamins as directed    Your need for certain vitamins and minerals, such as folic acid, increases during pregnancy  Prenatal vitamins provide some of the extra vitamins and minerals you need  Prenatal vitamins may also help to decrease the risk of certain birth defects  · Rest as needed  Put your feet up if you have swelling in your ankles and feet  · Do not smoke  Smoking increases your risk of a miscarriage and other health problems during your pregnancy  Smoking can cause your baby to be born early or weigh less at birth  Ask your healthcare provider for information if you need help quitting  · Do not drink alcohol  Alcohol passes from your body to your baby through the placenta  It can affect your baby's brain development and cause fetal alcohol syndrome (FAS)  FAS is a group of conditions that causes mental, behavior, and growth problems  · Talk to your healthcare provider before you take any medicines  Many medicines may harm your baby if you take them when you are pregnant  Do not take any medicines, vitamins, herbs, or supplements without first talking to your healthcare provider  Never use illegal or street drugs (such as marijuana or cocaine) while you are pregnant  · Talk to your healthcare provider before you travel  You may not be able to travel in an airplane after 36 weeks  He may also recommend that you avoid long road trips  Safety tips during pregnancy:   · Avoid hot tubs and saunas  Do not use a hot tub or sauna while you are pregnant, especially during your first trimester  Hot tubs and saunas may raise your baby's temperature and increase the risk of birth defects  · Avoid toxoplasmosis  This is an infection caused by eating raw meat or being around infected cat feces  It can cause birth defects, miscarriages, and other problems  Wash your hands after you touch raw meat  Make sure any meat is well-cooked before you eat it  Avoid raw eggs and unpasteurized milk   Use gloves or ask someone else to clean your cat's litter box while you are pregnant  · Ask your healthcare provider about travel  The most comfortable time to travel is during the second trimester  Ask your healthcare provider if you can travel after 36 weeks  You may not be able to travel in an airplane after 36 weeks  He may also recommend that you avoid long road trips  Changes that are happening with your baby:  By 38 weeks, your baby may weigh between 6 and 9 pounds  Your baby may be about 14 inches long from the top of the head to the rump (baby's bottom)  Your baby hears well enough to know your voice  As your baby gets larger, you may feel fewer kicks and more stretching and rolling  Your baby may move into a head-down position  Your baby will also rest lower in your abdomen  What you need to know about prenatal care: Your healthcare provider will check your blood pressure and weight  You may also need the following:  · A urine test  may also be done to check for sugar and protein  These can be signs of gestational diabetes or infection  Protein in your urine may also be a sign of preeclampsia  Preeclampsia is a condition that can develop during week 20 or later of your pregnancy  It causes high blood pressure, and it can cause problems with your kidneys and other organs  · A blood test  may be done to check for anemia (low iron level)  · A Tdap vaccine  may be recommended by your healthcare provider  · A group B strep test  is a test that is done to check for group B strep infection  Group B strep is a type of bacteria that may be found in the vagina or rectum  It can be passed to your baby during delivery if you have it  Your healthcare provider will take swab your vagina or rectum and send the sample to the lab for tests  · Fundal height  is a measurement of your uterus to check your baby's growth  This number is usually the same as the number of weeks that you have been pregnant   Your healthcare provider may also check your baby's position  · Your baby's heart rate  will be checked  © Copyright [employer] 2021 Information is for End User's use only and may not be sold, redistributed or otherwise used for commercial purposes  All illustrations and images included in CareNotes® are the copyrighted property of A [employer] M [employer]  or Torrey Pugh  The above information is an  only  It is not intended as medical advice for individual conditions or treatments  Talk to your doctor, nurse or pharmacist before following any medical regimen to see if it is safe and effective for you

## 2021-09-27 NOTE — PROGRESS NOTES
[address] VISIT  Name: Royer Lima  MRN: [id number]  : 2000      ASSESSMENT/PLAN:  Problem List        Other    Depression    Chlamydia infection affecting pregnancy in first trimester    Overview     LETICIA collected 21- +GC and +CT  LETICIA collected 21- negative           Increased nuchal translucency space on fetal ultrasound    36 weeks gestation of pregnancy    Overview     Delivery consent signed today  GBS collected  No obstetric complaints  F/u 1 week                 SUBJECTIVE 21 y o   36w4d here for PN visit  She denies contractions  She denies leakage of fluid and vaginal bleeding  She endorses good fetal movement  Her pregnancy is complicated by depression, chlamydia infection in pregnancy (treated and LETICIA negative on 21), and increased nuchal translucency  OBJECTIVE:  Vitals:    21 1258   BP: 107/72   Pulse: 76       Physical Exam  Vitals reviewed  Constitutional:       Appearance: Normal appearance  HENT:      Head: Normocephalic and atraumatic  Eyes:      Extraocular Movements: Extraocular movements intact  Cardiovascular:      Rate and Rhythm: Normal rate  Pulses: Normal pulses  Pulmonary:      Effort: Pulmonary effort is normal       Breath sounds: Normal breath sounds  Abdominal:      Palpations: Abdomen is soft  Comments: Gravid uterus   Genitourinary:     Comments: Normal appearing external genitalia  Musculoskeletal:         General: Normal range of motion  Cervical back: Normal range of motion  Skin:     General: Skin is warm and dry  Neurological:      Mental Status: She is alert     Psychiatric:         Mood and Affect: Mood normal          Behavior: Behavior normal          Fundal height: 35  FHT: 132       Future Appointments   Date Time Provider Kim Majano   10/6/2021 10:45 AM Melani Mcintyre MD [address] 62   10/13/2021 12:45  E  Guerita Nelson MD [place] [address] 10/20/2021 11:15 AM Sharon Diaz MD  [facility]   10/27/2021 12:45 PM Sebastian Jensen MD  [facility] Eliazbeth Martin MD  OB/GYN PGY-1  9/29/2021  1:31 PM

## 2021-09-29 ENCOUNTER — ROUTINE PRENATAL (OUTPATIENT)
Dept: OBGYN CLINIC | Facility: CLINIC | Age: 21
End: 2021-09-29

## 2021-09-29 VITALS
SYSTOLIC BLOOD PRESSURE: 107 MMHG | DIASTOLIC BLOOD PRESSURE: 72 MMHG | HEART RATE: 76 BPM | BODY MASS INDEX: 26.22 KG/M2 | WEIGHT: 148 LBS

## 2021-09-29 DIAGNOSIS — Z3A.36 36 WEEKS GESTATION OF PREGNANCY: Primary | ICD-10-CM

## 2021-09-29 PROCEDURE — 99213 OFFICE O/P EST LOW 20 MIN: CPT | Performed by: OBSTETRICS & GYNECOLOGY

## 2021-09-29 PROCEDURE — 87150 DNA/RNA AMPLIFIED PROBE: CPT

## 2021-10-01 LAB — GP B STREP DNA SPEC QL NAA+PROBE: NEGATIVE

## 2021-10-12 PROBLEM — Z3A.38 38 WEEKS GESTATION OF PREGNANCY: Status: ACTIVE | Noted: 2021-08-10

## 2021-10-13 ENCOUNTER — ROUTINE PRENATAL (OUTPATIENT)
Dept: OBGYN CLINIC | Facility: CLINIC | Age: 21
End: 2021-10-13

## 2021-10-13 VITALS
DIASTOLIC BLOOD PRESSURE: 75 MMHG | SYSTOLIC BLOOD PRESSURE: 125 MMHG | HEART RATE: 78 BPM | WEIGHT: 154.4 LBS | BODY MASS INDEX: 27.35 KG/M2

## 2021-10-13 DIAGNOSIS — Z34.03 ENCOUNTER FOR PRENATAL CARE OF FIRST PREGNANCY, THIRD TRIMESTER: ICD-10-CM

## 2021-10-13 DIAGNOSIS — Z3A.38 38 WEEKS GESTATION OF PREGNANCY: Primary | ICD-10-CM

## 2021-10-13 PROCEDURE — 99213 OFFICE O/P EST LOW 20 MIN: CPT | Performed by: OBSTETRICS & GYNECOLOGY

## 2021-10-20 ENCOUNTER — ROUTINE PRENATAL (OUTPATIENT)
Dept: OBGYN CLINIC | Facility: CLINIC | Age: 21
End: 2021-10-20

## 2021-10-20 DIAGNOSIS — Z34.93 PRENATAL CARE IN THIRD TRIMESTER: Primary | ICD-10-CM

## 2021-10-20 DIAGNOSIS — Z3A.39 39 WEEKS GESTATION OF PREGNANCY: ICD-10-CM

## 2021-10-20 PROBLEM — Z3A.38 38 WEEKS GESTATION OF PREGNANCY: Status: RESOLVED | Noted: 2021-08-10 | Resolved: 2021-10-20

## 2021-10-20 PROCEDURE — 99213 OFFICE O/P EST LOW 20 MIN: CPT | Performed by: OBSTETRICS & GYNECOLOGY

## 2021-10-21 ENCOUNTER — PATIENT MESSAGE (OUTPATIENT)
Dept: OBGYN CLINIC | Facility: CLINIC | Age: 21
End: 2021-10-21

## 2021-10-23 ENCOUNTER — HOSPITAL ENCOUNTER (OUTPATIENT)
Facility: HOSPITAL | Age: 21
Discharge: HOME | End: 2021-10-23
Attending: OBSTETRICS & GYNECOLOGY | Admitting: OBSTETRICS & GYNECOLOGY
Payer: COMMERCIAL

## 2021-10-23 VITALS
TEMPERATURE: 98 F | DIASTOLIC BLOOD PRESSURE: 71 MMHG | SYSTOLIC BLOOD PRESSURE: 122 MMHG | RESPIRATION RATE: 18 BRPM | BODY MASS INDEX: 27.1 KG/M2 | WEIGHT: 153 LBS | HEART RATE: 66 BPM | OXYGEN SATURATION: 98 %

## 2021-10-23 PROBLEM — O47.9 IRREGULAR UTERINE CONTRACTIONS: Status: ACTIVE | Noted: 2021-10-23

## 2021-10-23 PROCEDURE — NC001 PR NO CHARGE: Performed by: OBSTETRICS & GYNECOLOGY

## 2021-10-23 PROCEDURE — 99213 OFFICE O/P EST LOW 20 MIN: CPT

## 2021-10-27 ENCOUNTER — ROUTINE PRENATAL (OUTPATIENT)
Dept: OBGYN CLINIC | Facility: CLINIC | Age: 21
End: 2021-10-27

## 2021-10-27 VITALS
DIASTOLIC BLOOD PRESSURE: 80 MMHG | SYSTOLIC BLOOD PRESSURE: 120 MMHG | HEART RATE: 86 BPM | WEIGHT: 158.8 LBS | BODY MASS INDEX: 28.13 KG/M2

## 2021-10-27 DIAGNOSIS — A74.9 CHLAMYDIA INFECTION AFFECTING PREGNANCY IN FIRST TRIMESTER: Primary | ICD-10-CM

## 2021-10-27 DIAGNOSIS — O98.811 CHLAMYDIA INFECTION AFFECTING PREGNANCY IN FIRST TRIMESTER: Primary | ICD-10-CM

## 2021-10-27 DIAGNOSIS — Z3A.40 40 WEEKS GESTATION OF PREGNANCY: ICD-10-CM

## 2021-10-27 PROCEDURE — 87491 CHLMYD TRACH DNA AMP PROBE: CPT

## 2021-10-27 PROCEDURE — 99213 OFFICE O/P EST LOW 20 MIN: CPT | Performed by: OBSTETRICS & GYNECOLOGY

## 2021-10-27 PROCEDURE — 87591 N.GONORRHOEAE DNA AMP PROB: CPT

## 2021-10-28 ENCOUNTER — ANESTHESIA EVENT (INPATIENT)
Dept: ANESTHESIOLOGY | Facility: HOSPITAL | Age: 21
DRG: 560 | End: 2021-10-28
Payer: COMMERCIAL

## 2021-10-28 ENCOUNTER — ANESTHESIA (INPATIENT)
Dept: ANESTHESIOLOGY | Facility: HOSPITAL | Age: 21
DRG: 560 | End: 2021-10-28
Payer: COMMERCIAL

## 2021-10-28 ENCOUNTER — HOSPITAL ENCOUNTER (INPATIENT)
Facility: HOSPITAL | Age: 21
LOS: 2 days | Discharge: HOME | DRG: 560 | End: 2021-10-30
Attending: OBSTETRICS & GYNECOLOGY | Admitting: OBSTETRICS & GYNECOLOGY
Payer: COMMERCIAL

## 2021-10-28 DIAGNOSIS — Z3A.40 40 WEEKS GESTATION OF PREGNANCY: Primary | ICD-10-CM

## 2021-10-28 LAB
ABO GROUP BLD: NORMAL
AMPHETAMINES SERPL QL SCN: NEGATIVE
BARBITURATES UR QL: NEGATIVE
BASE EXCESS BLDCOA CALC-SCNC: -4 MMOL/L (ref 3–11)
BASE EXCESS BLDCOV CALC-SCNC: -4.7 MMOL/L (ref 1–9)
BENZODIAZ UR QL: NEGATIVE
BLD GP AB SCN SERPL QL: NEGATIVE
COCAINE UR QL: NEGATIVE
ERYTHROCYTE [DISTWIDTH] IN BLOOD BY AUTOMATED COUNT: 13.5 % (ref 11.6–15.1)
HCO3 BLDCOA-SCNC: 24 MMOL/L (ref 17.3–27.3)
HCO3 BLDCOV-SCNC: 20.3 MMOL/L (ref 12.2–28.6)
HCT VFR BLD AUTO: 33.4 % (ref 34.8–46.1)
HGB BLD-MCNC: 10.8 G/DL (ref 11.5–15.4)
MCH RBC QN AUTO: 28.4 PG (ref 26.8–34.3)
MCHC RBC AUTO-ENTMCNC: 32.3 G/DL (ref 31.4–37.4)
MCV RBC AUTO: 88 FL (ref 82–98)
METHADONE UR QL: NEGATIVE
O2 CT VFR BLDCOA CALC: 6.5 ML/DL
OPIATES UR QL SCN: NEGATIVE
OXYCODONE+OXYMORPHONE UR QL SCN: NEGATIVE
OXYHGB MFR BLDCOA: 28.2 %
OXYHGB MFR BLDCOV: 47.3 %
PCO2 BLDCOA: 54.9 MM[HG] (ref 30–60)
PCO2 BLDCOV: 37.9 MM HG (ref 27–43)
PCP UR QL: NEGATIVE
PH BLDCOA: 7.26 [PH] (ref 7.23–7.43)
PH BLDCOV: 7.35 [PH] (ref 7.19–7.49)
PLATELET # BLD AUTO: 217 THOUSANDS/UL (ref 149–390)
PMV BLD AUTO: 11.2 FL (ref 8.9–12.7)
PO2 BLDCOA: 15.3 MM HG (ref 5–25)
PO2 BLDCOV: 19.9 MM HG (ref 15–45)
RBC # BLD AUTO: 3.8 MILLION/UL (ref 3.81–5.12)
RH BLD: POSITIVE
SAO2 % BLDCOV: 10.7 ML/DL
SPECIMEN EXPIRATION DATE: NORMAL
THC UR QL: NEGATIVE
WBC # BLD AUTO: 14.07 THOUSAND/UL (ref 4.31–10.16)

## 2021-10-28 PROCEDURE — 80307 DRUG TEST PRSMV CHEM ANLYZR: CPT

## 2021-10-28 PROCEDURE — 4A1HXCZ MONITORING OF PRODUCTS OF CONCEPTION, CARDIAC RATE, EXTERNAL APPROACH: ICD-10-PCS | Performed by: OBSTETRICS & GYNECOLOGY

## 2021-10-28 PROCEDURE — NC001 PR NO CHARGE: Performed by: OBSTETRICS & GYNECOLOGY

## 2021-10-28 PROCEDURE — 86901 BLOOD TYPING SEROLOGIC RH(D): CPT

## 2021-10-28 PROCEDURE — 82805 BLOOD GASES W/O2 SATURATION: CPT | Performed by: OBSTETRICS & GYNECOLOGY

## 2021-10-28 PROCEDURE — 0UQGXZZ REPAIR VAGINA, EXTERNAL APPROACH: ICD-10-PCS | Performed by: OBSTETRICS & GYNECOLOGY

## 2021-10-28 PROCEDURE — 86592 SYPHILIS TEST NON-TREP QUAL: CPT

## 2021-10-28 PROCEDURE — 85027 COMPLETE CBC AUTOMATED: CPT

## 2021-10-28 PROCEDURE — 99211 OFF/OP EST MAY X REQ PHY/QHP: CPT

## 2021-10-28 PROCEDURE — 59409 OBSTETRICAL CARE: CPT | Performed by: OBSTETRICS & GYNECOLOGY

## 2021-10-28 PROCEDURE — 86850 RBC ANTIBODY SCREEN: CPT

## 2021-10-28 PROCEDURE — 0UQMXZZ REPAIR VULVA, EXTERNAL APPROACH: ICD-10-PCS | Performed by: OBSTETRICS & GYNECOLOGY

## 2021-10-28 PROCEDURE — 86900 BLOOD TYPING SEROLOGIC ABO: CPT

## 2021-10-28 RX ORDER — SIMETHICONE 80 MG
80 TABLET,CHEWABLE ORAL 4 TIMES DAILY PRN
Status: DISCONTINUED | OUTPATIENT
Start: 2021-10-28 | End: 2021-10-30 | Stop reason: HOSPADM

## 2021-10-28 RX ORDER — ACETAMINOPHEN 325 MG/1
650 TABLET ORAL EVERY 4 HOURS PRN
Status: DISCONTINUED | OUTPATIENT
Start: 2021-10-28 | End: 2021-10-30 | Stop reason: HOSPADM

## 2021-10-28 RX ORDER — DOCUSATE SODIUM 100 MG/1
100 CAPSULE, LIQUID FILLED ORAL 2 TIMES DAILY
Status: DISCONTINUED | OUTPATIENT
Start: 2021-10-28 | End: 2021-10-30 | Stop reason: HOSPADM

## 2021-10-28 RX ORDER — DIPHENHYDRAMINE HCL 25 MG
25 TABLET ORAL EVERY 6 HOURS PRN
Status: DISCONTINUED | OUTPATIENT
Start: 2021-10-28 | End: 2021-10-30 | Stop reason: HOSPADM

## 2021-10-28 RX ORDER — IBUPROFEN 600 MG/1
600 TABLET ORAL EVERY 6 HOURS PRN
Status: DISCONTINUED | OUTPATIENT
Start: 2021-10-28 | End: 2021-10-30 | Stop reason: HOSPADM

## 2021-10-28 RX ORDER — OXYTOCIN/RINGER'S LACTATE 30/500 ML
1-30 PLASTIC BAG, INJECTION (ML) INTRAVENOUS
Status: DISCONTINUED | OUTPATIENT
Start: 2021-10-28 | End: 2021-10-28

## 2021-10-28 RX ORDER — DIAPER,BRIEF,INFANT-TODD,DISP
1 EACH MISCELLANEOUS 4 TIMES DAILY PRN
Status: DISCONTINUED | OUTPATIENT
Start: 2021-10-28 | End: 2021-10-30 | Stop reason: HOSPADM

## 2021-10-28 RX ORDER — ONDANSETRON 2 MG/ML
4 INJECTION INTRAMUSCULAR; INTRAVENOUS EVERY 8 HOURS PRN
Status: DISCONTINUED | OUTPATIENT
Start: 2021-10-28 | End: 2021-10-30 | Stop reason: HOSPADM

## 2021-10-28 RX ORDER — CALCIUM CARBONATE 200(500)MG
1000 TABLET,CHEWABLE ORAL DAILY PRN
Status: DISCONTINUED | OUTPATIENT
Start: 2021-10-28 | End: 2021-10-30 | Stop reason: HOSPADM

## 2021-10-28 RX ORDER — LIDOCAINE HYDROCHLORIDE AND EPINEPHRINE 15; 5 MG/ML; UG/ML
INJECTION, SOLUTION EPIDURAL
Status: COMPLETED | OUTPATIENT
Start: 2021-10-28 | End: 2021-10-28

## 2021-10-28 RX ORDER — ROPIVACAINE HYDROCHLORIDE 2 MG/ML
INJECTION, SOLUTION EPIDURAL; INFILTRATION; PERINEURAL
Status: COMPLETED
Start: 2021-10-28 | End: 2021-10-28

## 2021-10-28 RX ORDER — ONDANSETRON 2 MG/ML
4 INJECTION INTRAMUSCULAR; INTRAVENOUS EVERY 6 HOURS PRN
Status: DISCONTINUED | OUTPATIENT
Start: 2021-10-28 | End: 2021-10-28

## 2021-10-28 RX ORDER — SODIUM CHLORIDE, SODIUM LACTATE, POTASSIUM CHLORIDE, CALCIUM CHLORIDE 600; 310; 30; 20 MG/100ML; MG/100ML; MG/100ML; MG/100ML
125 INJECTION, SOLUTION INTRAVENOUS CONTINUOUS
Status: DISCONTINUED | OUTPATIENT
Start: 2021-10-28 | End: 2021-10-28

## 2021-10-28 RX ADMIN — ROPIVACAINE HYDROCHLORIDE: 2 INJECTION, SOLUTION EPIDURAL; INFILTRATION at 10:45

## 2021-10-28 RX ADMIN — Medication 2 MILLI-UNITS/MIN: at 12:24

## 2021-10-28 RX ADMIN — SODIUM CHLORIDE, SODIUM LACTATE, POTASSIUM CHLORIDE, AND CALCIUM CHLORIDE 125 ML/HR: .6; .31; .03; .02 INJECTION, SOLUTION INTRAVENOUS at 11:24

## 2021-10-28 RX ADMIN — BENZOCAINE AND LEVOMENTHOL: 200; 5 SPRAY TOPICAL at 22:28

## 2021-10-28 RX ADMIN — WITCH HAZEL 1 PAD: 500 SOLUTION RECTAL; TOPICAL at 22:28

## 2021-10-28 RX ADMIN — LIDOCAINE HYDROCHLORIDE AND EPINEPHRINE 5 ML: 15; 5 INJECTION, SOLUTION EPIDURAL at 10:36

## 2021-10-29 LAB
C TRACH DNA SPEC QL NAA+PROBE: NEGATIVE
N GONORRHOEA DNA SPEC QL NAA+PROBE: NEGATIVE
RPR SER QL: NORMAL

## 2021-10-29 PROCEDURE — 99024 POSTOP FOLLOW-UP VISIT: CPT | Performed by: OBSTETRICS & GYNECOLOGY

## 2021-10-29 RX ORDER — ACETAMINOPHEN AND CODEINE PHOSPHATE 120; 12 MG/5ML; MG/5ML
1 SOLUTION ORAL DAILY
Qty: 30 TABLET | Refills: 3 | OUTPATIENT
Start: 2021-10-29

## 2021-10-29 RX ADMIN — IBUPROFEN 600 MG: 600 TABLET ORAL at 09:06

## 2021-10-29 RX ADMIN — DOCUSATE SODIUM 100 MG: 100 CAPSULE ORAL at 17:58

## 2021-10-29 RX ADMIN — DOCUSATE SODIUM 100 MG: 100 CAPSULE ORAL at 09:05

## 2021-10-30 VITALS
DIASTOLIC BLOOD PRESSURE: 68 MMHG | WEIGHT: 158 LBS | SYSTOLIC BLOOD PRESSURE: 124 MMHG | RESPIRATION RATE: 18 BRPM | OXYGEN SATURATION: 98 % | HEART RATE: 77 BPM | BODY MASS INDEX: 28 KG/M2 | HEIGHT: 63 IN | TEMPERATURE: 99 F

## 2021-10-30 PROBLEM — O98.811 CHLAMYDIA INFECTION AFFECTING PREGNANCY IN FIRST TRIMESTER: Status: RESOLVED | Noted: 2021-04-07 | Resolved: 2021-10-30

## 2021-10-30 PROBLEM — A74.9 CHLAMYDIA INFECTION AFFECTING PREGNANCY IN FIRST TRIMESTER: Status: RESOLVED | Noted: 2021-04-07 | Resolved: 2021-10-30

## 2021-10-30 PROBLEM — O28.3 INCREASED NUCHAL TRANSLUCENCY SPACE ON FETAL ULTRASOUND: Status: RESOLVED | Noted: 2021-04-22 | Resolved: 2021-10-30

## 2021-10-30 PROCEDURE — 99024 POSTOP FOLLOW-UP VISIT: CPT | Performed by: OBSTETRICS & GYNECOLOGY

## 2021-10-30 RX ORDER — DOCUSATE SODIUM 100 MG/1
100 CAPSULE, LIQUID FILLED ORAL 2 TIMES DAILY
Refills: 0 | Status: CANCELLED
Start: 2021-10-30

## 2021-10-30 RX ORDER — IBUPROFEN 600 MG/1
600 TABLET ORAL EVERY 6 HOURS PRN
Qty: 40 TABLET | Refills: 0 | Status: SHIPPED | OUTPATIENT
Start: 2021-10-30 | End: 2022-02-15

## 2021-10-30 RX ORDER — ACETAMINOPHEN 325 MG/1
650 TABLET ORAL EVERY 4 HOURS PRN
Refills: 0 | Status: CANCELLED
Start: 2021-10-30

## 2021-10-30 RX ORDER — IBUPROFEN 600 MG/1
600 TABLET ORAL EVERY 6 HOURS PRN
Qty: 30 TABLET | Refills: 0 | Status: SHIPPED | OUTPATIENT
Start: 2021-10-30 | End: 2021-10-30

## 2021-10-30 RX ORDER — IBUPROFEN 600 MG/1
600 TABLET ORAL EVERY 6 HOURS PRN
Qty: 30 TABLET | Refills: 0 | Status: CANCELLED
Start: 2021-10-30

## 2021-10-30 RX ADMIN — IBUPROFEN 600 MG: 600 TABLET ORAL at 08:27

## 2021-10-30 RX ADMIN — DOCUSATE SODIUM 100 MG: 100 CAPSULE ORAL at 08:27

## 2021-11-05 LAB — PLACENTA IN STORAGE: NORMAL

## 2022-01-26 ENCOUNTER — ULTRASOUND (OUTPATIENT)
Dept: OBGYN CLINIC | Facility: CLINIC | Age: 22
End: 2022-01-26

## 2022-01-26 VITALS
DIASTOLIC BLOOD PRESSURE: 80 MMHG | SYSTOLIC BLOOD PRESSURE: 128 MMHG | HEART RATE: 98 BPM | WEIGHT: 127 LBS | BODY MASS INDEX: 22.5 KG/M2

## 2022-01-26 DIAGNOSIS — Z34.90 EARLY STAGE OF PREGNANCY: Primary | ICD-10-CM

## 2022-01-26 PROCEDURE — 99213 OFFICE O/P EST LOW 20 MIN: CPT | Performed by: OBSTETRICS & GYNECOLOGY

## 2022-01-26 PROCEDURE — 76801 OB US < 14 WKS SINGLE FETUS: CPT | Performed by: OBSTETRICS & GYNECOLOGY

## 2022-01-26 RX ORDER — SWAB
1 SWAB, NON-MEDICATED MISCELLANEOUS DAILY
Qty: 30 TABLET | Refills: 2 | Status: SHIPPED | OUTPATIENT
Start: 2022-01-26 | End: 2022-02-15

## 2022-01-26 NOTE — PROGRESS NOTES
Assessment  24 y o   at Unknown presenting for amenorrhea  Pregnancy confirmed, dating inconsistent with LMP  COREEN 2022  Plan  Diagnoses and all orders for this visit:    Early stage of pregnancy      - Prenatal vitamin  - Prenatal panel (slips given today)  - Discussed genetic screening  - Prenatal Nursing Intake in 2 weeks  - Prenatal H&P in 4 weeks     ____________________________________________________________      Subjective   Concetta Kyle is a 24 y o  Carmentta Hams with an LMP of No LMP recorded (lmp unknown)  (unknown by LMP) who presents for amenorrhea  She notes she took a home pregnancy test on 2022 and it was positive  She is currently otherwise without complaint  Patient notes that this pregnancy was unplanned  She was not using contraception at the time  She reports she is uncertain of her LMP and that she has regular menses  She has has no vaginal bleeding since her LMP  Patient's prior pregnancies were complicated by short interval pregnancy  Objective  /80   Pulse 98   Wt 57 6 kg (127 lb)   LMP  (LMP Unknown)   BMI 22 50 kg/m²       Physical Exam:  Physical Exam  Constitutional:       Appearance: Normal appearance  She is normal weight  Cardiovascular:      Rate and Rhythm: Normal rate  Pulses: Normal pulses  Pulmonary:      Effort: Pulmonary effort is normal    Abdominal:      Palpations: Abdomen is soft  Musculoskeletal:         General: Normal range of motion  Cervical back: Normal range of motion  Skin:     General: Skin is warm  Neurological:      General: No focal deficit present  Mental Status: She is alert and oriented to person, place, and time  Psychiatric:         Mood and Affect: Mood normal          Behavior: Behavior normal          Transvaginal Pelvic Ultrasound  García IUP  CRL 5 7 mm, consistent with EGA 6w2d  +yolk sac  +cardiac activity   bpm  No adnexal masses appreciated  - Patient may consider   Information regarding  care provided  - We discuss if she is willing to proceed pregnancy we will see her for nurse intake and possible repeat US due to early stage    Fran Medley MD  OBGYN, PGY-4  2022  4:10 PM

## 2022-02-15 ENCOUNTER — OFFICE VISIT (OUTPATIENT)
Dept: OBGYN CLINIC | Facility: CLINIC | Age: 22
End: 2022-02-15

## 2022-02-15 VITALS
HEART RATE: 71 BPM | BODY MASS INDEX: 21.61 KG/M2 | SYSTOLIC BLOOD PRESSURE: 106 MMHG | WEIGHT: 122 LBS | DIASTOLIC BLOOD PRESSURE: 71 MMHG

## 2022-02-15 DIAGNOSIS — A59.01 TRICHOMONAL VAGINITIS: ICD-10-CM

## 2022-02-15 DIAGNOSIS — N89.8 VAGINA ITCHING: Primary | ICD-10-CM

## 2022-02-15 DIAGNOSIS — N89.8 VAGINAL DISCHARGE: ICD-10-CM

## 2022-02-15 DIAGNOSIS — N89.8 VAGINAL ODOR: ICD-10-CM

## 2022-02-15 PROBLEM — Z34.90 EARLY STAGE OF PREGNANCY: Chronic | Status: RESOLVED | Noted: 2022-01-26 | Resolved: 2022-02-15

## 2022-02-15 PROBLEM — F32.A DEPRESSION: Status: RESOLVED | Noted: 2017-02-02 | Resolved: 2022-02-15

## 2022-02-15 PROBLEM — O47.9 IRREGULAR UTERINE CONTRACTIONS: Status: RESOLVED | Noted: 2021-10-23 | Resolved: 2022-02-15

## 2022-02-15 LAB
BV WHIFF TEST VAG QL: ABNORMAL
CLUE CELLS SPEC QL WET PREP: ABNORMAL
PH SMN: 5 [PH]
SL AMB POCT WET MOUNT: ABNORMAL
T VAGINALIS VAG QL WET PREP: ABNORMAL
YEAST VAG QL WET PREP: ABNORMAL

## 2022-02-15 PROCEDURE — 99213 OFFICE O/P EST LOW 20 MIN: CPT | Performed by: NURSE PRACTITIONER

## 2022-02-15 PROCEDURE — 87591 N.GONORRHOEAE DNA AMP PROB: CPT | Performed by: NURSE PRACTITIONER

## 2022-02-15 PROCEDURE — 87491 CHLMYD TRACH DNA AMP PROBE: CPT | Performed by: NURSE PRACTITIONER

## 2022-02-15 PROCEDURE — 87210 SMEAR WET MOUNT SALINE/INK: CPT | Performed by: NURSE PRACTITIONER

## 2022-02-15 RX ORDER — METRONIDAZOLE 500 MG/1
2000 TABLET ORAL ONCE
Qty: 4 TABLET | Refills: 0 | Status: SHIPPED | OUTPATIENT
Start: 2022-02-15 | End: 2022-02-15

## 2022-02-15 NOTE — PROGRESS NOTES
PROBLEM GYNECOLOGICAL VISIT    Dixon Grimaldo is a 24 y o  female who presents today with complaint of vaginal itching, discharge, and odor  Her general medical history has been reviewed and she reports it as follows:    Past Medical History:   Diagnosis Date    Chlamydia 2021     Past Surgical History:   Procedure Laterality Date    NO PAST SURGERIES       OB History        2    Para   1    Term   1       0    AB   0    Living   1       SAB   0    IAB   0    Ectopic   0    Multiple   0    Live Births   1               Social History     Tobacco Use    Smoking status: Never Smoker    Smokeless tobacco: Never Used   Vaping Use    Vaping Use: Never used   Substance Use Topics    Alcohol use: Yes     Comment: couple times/month    Drug use: Not Currently     Types: Marijuana     Comment: last used marijuana 3/2021       No current outpatient medications    History of Present Illness:   Patient is currently diagnosed with early pregnancy, but has already scheduled for termination of pregnancy via [address] [place] [address] on 2022  However, she reports vaginal itching/irritation, discharge, and odor for the past 1-2 weeks  Denies pelvic pain or vaginal bleeding  Review of Systems:  Review of Systems   Constitutional: Negative  Gastrointestinal: Negative  Genitourinary: Positive for vaginal discharge and vaginal pain  Negative for pelvic pain and vaginal bleeding  Physical Exam:  /71   Pulse 71   Wt 55 3 kg (122 lb)   LMP  (LMP Unknown)   BMI 21 61 kg/m²   Physical Exam  Constitutional:       General: She is not in acute distress  Genitourinary:      Vulva exam comments: normal       Vaginal discharge and erythema present  Abdominal:      Palpations: Abdomen is soft  Tenderness: There is no abdominal tenderness  Neurological:      Mental Status: She is alert  Skin:     General: Skin is warm and dry  Vitals reviewed         Point of Care Testing:   -Wet mount: no clue cells, + trichomonads, many WBC's, pH=5 0   -KOH mount: no hyphae   -Whiff: negative    Assessment:   1  Trichomonal vaginitis    Plan:   1  Cultures ordered: GC/CT  2  Given Rx Flagyl and reviewed partner treatment  3  Return to office in 2 weeks (after termination of pregnancy) to discuss birth control  Reviewed with patient that test results are available in MyChart immediately, but that they will not necessarily be reviewed by me immediately  Explained that I will review results at my earliest opportunity and contact patient appropriately

## 2022-02-16 LAB
C TRACH DNA SPEC QL NAA+PROBE: NEGATIVE
N GONORRHOEA DNA SPEC QL NAA+PROBE: NEGATIVE

## 2022-02-23 ENCOUNTER — HOSPITAL ENCOUNTER (EMERGENCY)
Facility: HOSPITAL | Age: 22
Discharge: LEFT BEFORE DISCHARGE | End: 2022-02-23
Payer: MEDICARE

## 2022-02-23 VITALS
DIASTOLIC BLOOD PRESSURE: 63 MMHG | SYSTOLIC BLOOD PRESSURE: 116 MMHG | RESPIRATION RATE: 20 BRPM | BODY MASS INDEX: 21.79 KG/M2 | HEART RATE: 106 BPM | TEMPERATURE: 98.1 F | OXYGEN SATURATION: 98 % | WEIGHT: 123.02 LBS

## 2022-02-23 RX ORDER — ONDANSETRON 4 MG/1
4 TABLET, ORALLY DISINTEGRATING ORAL ONCE
Status: COMPLETED | OUTPATIENT
Start: 2022-02-23 | End: 2022-02-23

## 2022-02-23 RX ADMIN — ONDANSETRON 4 MG: 4 TABLET, ORALLY DISINTEGRATING ORAL at 19:07

## 2022-03-09 ENCOUNTER — OFFICE VISIT (OUTPATIENT)
Dept: OBGYN CLINIC | Facility: CLINIC | Age: 22
End: 2022-03-09

## 2022-03-09 VITALS
SYSTOLIC BLOOD PRESSURE: 114 MMHG | WEIGHT: 119 LBS | HEIGHT: 63 IN | DIASTOLIC BLOOD PRESSURE: 78 MMHG | BODY MASS INDEX: 21.09 KG/M2 | HEART RATE: 79 BPM

## 2022-03-09 DIAGNOSIS — Z30.09 UNWANTED FERTILITY: ICD-10-CM

## 2022-03-09 DIAGNOSIS — Z30.09 UNWANTED FERTILITY: Primary | ICD-10-CM

## 2022-03-09 PROCEDURE — 99212 OFFICE O/P EST SF 10 MIN: CPT | Performed by: NURSE PRACTITIONER

## 2022-03-09 RX ORDER — ETONOGESTREL AND ETHINYL ESTRADIOL 11.7; 2.7 MG/1; MG/1
INSERT, EXTENDED RELEASE VAGINAL
Qty: 1 EACH | Refills: 3 | Status: SHIPPED | OUTPATIENT
Start: 2022-03-09 | End: 2022-03-10

## 2022-03-09 RX ORDER — ETONOGESTREL AND ETHINYL ESTRADIOL 11.7; 2.7 MG/1; MG/1
INSERT, EXTENDED RELEASE VAGINAL
Qty: 1 EACH | Refills: 3 | Status: SHIPPED | OUTPATIENT
Start: 2022-03-09 | End: 2022-03-09

## 2022-03-09 NOTE — PROGRESS NOTES
Desirae Lucero presents today post- to discuss contraception  She had medical termination of pregnancy on 2022  She reports bleeding stopped 5 days ago  She has been having sexual intercourse, but states has used condoms  She desires to initiate contraception with NuvaRing  Given Rx NuvaRing and instructed on appropriate use  Return in 3 months for ring check and annual GYN exam with first pap smear

## 2022-03-10 RX ORDER — ETONOGESTREL AND ETHINYL ESTRADIOL .12; .015 MG/D; MG/D
RING VAGINAL
Qty: 1 EACH | Refills: 3 | Status: SHIPPED | OUTPATIENT
Start: 2022-03-10 | End: 2022-06-09 | Stop reason: SDUPTHER

## 2022-03-15 ENCOUNTER — TELEPHONE (OUTPATIENT)
Dept: OBGYN CLINIC | Facility: CLINIC | Age: 22
End: 2022-03-15

## 2022-03-15 NOTE — TELEPHONE ENCOUNTER
Patient notified that Jose Posey was approved by insurance and to call pharmacy to find out when is ready

## 2022-04-06 ENCOUNTER — TELEPHONE (OUTPATIENT)
Dept: OBGYN CLINIC | Facility: CLINIC | Age: 22
End: 2022-04-06

## 2022-06-09 ENCOUNTER — ANNUAL EXAM (OUTPATIENT)
Dept: OBGYN CLINIC | Facility: CLINIC | Age: 22
End: 2022-06-09

## 2022-06-09 VITALS
WEIGHT: 118.6 LBS | BODY MASS INDEX: 21.83 KG/M2 | HEART RATE: 63 BPM | DIASTOLIC BLOOD PRESSURE: 81 MMHG | HEIGHT: 62 IN | SYSTOLIC BLOOD PRESSURE: 129 MMHG

## 2022-06-09 DIAGNOSIS — Z12.4 SCREENING FOR CERVICAL CANCER: ICD-10-CM

## 2022-06-09 DIAGNOSIS — Z11.3 SCREEN FOR STD (SEXUALLY TRANSMITTED DISEASE): ICD-10-CM

## 2022-06-09 DIAGNOSIS — Z01.419 ENCOUNTER FOR GYNECOLOGICAL EXAMINATION WITHOUT ABNORMAL FINDING: Primary | ICD-10-CM

## 2022-06-09 DIAGNOSIS — Z30.09 UNWANTED FERTILITY: ICD-10-CM

## 2022-06-09 DIAGNOSIS — Z12.39 ENCOUNTER FOR BREAST CANCER SCREENING USING NON-MAMMOGRAM MODALITY: ICD-10-CM

## 2022-06-09 PROCEDURE — 87491 CHLMYD TRACH DNA AMP PROBE: CPT | Performed by: NURSE PRACTITIONER

## 2022-06-09 PROCEDURE — 87591 N.GONORRHOEAE DNA AMP PROB: CPT | Performed by: NURSE PRACTITIONER

## 2022-06-09 PROCEDURE — G0145 SCR C/V CYTO,THINLAYER,RESCR: HCPCS | Performed by: NURSE PRACTITIONER

## 2022-06-09 PROCEDURE — 99395 PREV VISIT EST AGE 18-39: CPT | Performed by: NURSE PRACTITIONER

## 2022-06-09 RX ORDER — ETONOGESTREL AND ETHINYL ESTRADIOL 11.7; 2.7 MG/1; MG/1
1 INSERT, EXTENDED RELEASE VAGINAL
Qty: 1 EACH | Refills: 12 | Status: SHIPPED | OUTPATIENT
Start: 2022-06-09

## 2022-06-09 NOTE — PROGRESS NOTES
Zeenat Marie is a 24 y o  female who presents today for annual GYN exam   This is her first pap smear  She reports menses as regular  Patient's last menstrual period was 2022 (exact date)  Her general medical history has been reviewed and she reports it as follows:    Past Medical History:   Diagnosis Date    Chlamydia 2021     Past Surgical History:   Procedure Laterality Date    NO PAST SURGERIES       OB History        2    Para   1    Term   1       0    AB   0    Living   1       SAB   0    IAB   0    Ectopic   0    Multiple   0    Live Births   1               Social History     Tobacco Use    Smoking status: Never Smoker    Smokeless tobacco: Never Used   Vaping Use    Vaping Use: Never used   Substance Use Topics    Alcohol use: Yes     Comment: couple times/month    Drug use: Not Currently     Types: Marijuana     Comment: last used marijuana 3/2021     Social History     Substance and Sexual Activity   Sexual Activity Yes    Partners: Male    Birth control/protection: None     Cancer-related family history is negative for Breast cancer, Colon cancer, Ovarian cancer, and Cancer  Current Outpatient Medications   Medication Instructions    EluRyng 0 12-0 015 MG/24HR vaginal ring INSERT 1 RING VAGINALLY AS DIRECTED  REMOVE AFTER 3 WEEKS & WAIT 7 DAYS BEFORE INSERTING A NEW RING       Review of Systems:  Review of Systems   Constitutional: Negative  Gastrointestinal: Negative  Genitourinary: Negative for difficulty urinating, menstrual problem, pelvic pain and vaginal discharge  Skin: Negative  Physical Exam:  /81   Pulse 63   Ht 5' 2" (1 575 m)   Wt 53 8 kg (118 lb 9 6 oz)   LMP 2022 (Exact Date)   Breastfeeding Unknown   BMI 21 69 kg/m²   Physical Exam  Constitutional:       General: She is not in acute distress  Appearance: She is well-developed     Genitourinary:      Vulva normal       No lesions in the vagina  Vaginal bleeding present  Right Adnexa: not tender and no mass present  Left Adnexa: not tender and no mass present  No cervical motion tenderness or lesion  Uterus is not tender  Breasts:      Right: No mass, nipple discharge, skin change or tenderness  Left: No mass, nipple discharge, skin change or tenderness  Neck:      Thyroid: No thyromegaly  Cardiovascular:      Rate and Rhythm: Normal rate and regular rhythm  Pulmonary:      Effort: Pulmonary effort is normal    Abdominal:      Palpations: Abdomen is soft  Tenderness: There is no abdominal tenderness  Musculoskeletal:      Cervical back: Neck supple  Neurological:      Mental Status: She is alert and oriented to person, place, and time  Skin:     General: Skin is warm and dry  Vitals reviewed  Assessment/Plan:   1  Normal well-woman GYN exam   2  Cervical cancer screening:  Normal cervical exam   Pap smear done with HPV reflex  3  STD screening:  Orders placed for vaginal GC/CT cultures  Declines screening with serum anti-HIV, anti-HCV, HbsAg, RPR    4  Breast cancer screening:  Normal breast exam   Reviewed breast self-awareness  5  Depression Screening: Patient's depression screening was assessed with a PHQ-2 score of 0  Their PHQ-9 score was 1  Clinically patient does not have depression  No treatment is required  6  BMI Counseling: Body mass index is 21 69 kg/m²  No intervention indicated  7  Contraception:  Vaginal ring  Reports she is very happy with this method and desires to continue  Given Rx refills for another year  8  Return to office in 1 year for annual GYN exam     Reviewed with patient that test results are available in [address] immediately, but that they will not necessarily be reviewed by me immediately  Explained that I will review results at my earliest opportunity and contact patient appropriately

## 2022-06-09 NOTE — LETTER
2022    To Oracio THOMSON: 2000      This letter is to advise you that your recent CULTURE for gonorrhea and chlamydia results were reviewed by me and are NORMAL  Please contact the office for an appointment if you have any additional concerns      SERG Page

## 2022-06-09 NOTE — PATIENT INSTRUCTIONS
Thank you for your confidence in our team    We appreciate you and welcome your feedback  If you receive a survey from us, please take a few moments to let us know how we are doing     Sincerely,  SERG Amin

## 2022-06-14 LAB
C TRACH DNA SPEC QL NAA+PROBE: NEGATIVE
N GONORRHOEA DNA SPEC QL NAA+PROBE: NEGATIVE

## 2022-06-21 ENCOUNTER — TELEPHONE (OUTPATIENT)
Dept: OBGYN CLINIC | Facility: CLINIC | Age: 22
End: 2022-06-21

## 2022-06-21 LAB
LAB AP GYN PRIMARY INTERPRETATION: NORMAL
Lab: NORMAL

## 2022-06-21 NOTE — TELEPHONE ENCOUNTER
Please advise patient that pap smear was unable to be evaluated due to too much blood with the specimen  Please have her make appointment to come back to see me for repeat pap smear for week of 7/5/2022

## 2023-01-03 ENCOUNTER — HOSPITAL ENCOUNTER (EMERGENCY)
Facility: HOSPITAL | Age: 23
Discharge: HOME | End: 2023-01-03
Attending: EMERGENCY MEDICINE

## 2023-01-03 VITALS
HEART RATE: 80 BPM | SYSTOLIC BLOOD PRESSURE: 118 MMHG | OXYGEN SATURATION: 99 % | DIASTOLIC BLOOD PRESSURE: 65 MMHG | TEMPERATURE: 97.8 F | RESPIRATION RATE: 18 BRPM

## 2023-01-03 DIAGNOSIS — Z20.2 POSSIBLE EXPOSURE TO STD: ICD-10-CM

## 2023-01-03 DIAGNOSIS — J02.9 PHARYNGITIS, UNSPECIFIED ETIOLOGY: Primary | ICD-10-CM

## 2023-01-03 DIAGNOSIS — A74.9 CHLAMYDIA: ICD-10-CM

## 2023-01-03 DIAGNOSIS — A54.9 GONORRHEA: ICD-10-CM

## 2023-01-03 LAB — S PYO DNA THROAT QL NAA+PROBE: NOT DETECTED

## 2023-01-03 NOTE — ED PROVIDER NOTES
History  Chief Complaint   Patient presents with   • Sore Throat     Sore throat x3 days  Denies fevers  No pain meds pta  Adriano Laurent is a 25 y o  female with no significant past medical history presenting to the ER complaining of sore throat x3 days  Patient reports associated lymphadenopathy  She denies any fevers, cough, congestion, headache, abdominal pain, vomiting, diarrhea, chest pain, shortness of breath, or sick contacts  Patient has not attempted any treatment  Patient denies difficulty swallowing, difficulty breathing, or difficulty eating or drinking  Patient reports that she is unsure whether her boyfriend has been monogamous with her and would like to be tested for gonorrhea and chlamydia  She denies any pelvic pain, vaginal pain, vaginal discharge, vaginal itching, or urinary symptoms  Prior to Admission Medications   Prescriptions Last Dose Informant Patient Reported? Taking?   etonogestrel-ethinyl estradiol (EluRyng) 0 12-0 015 MG/24HR vaginal ring   No No   Sig: Insert 1 each into the vagina every 28 days Insert vaginally and leave in place for 3 consecutive weeks, then remove for 1 week  Facility-Administered Medications: None       Past Medical History:   Diagnosis Date   • Chlamydia 2021       Past Surgical History:   Procedure Laterality Date   • NO PAST SURGERIES         Family History   Problem Relation Age of Onset   • No Known Problems Mother    • No Known Problems Father    • No Known Problems Sister    • No Known Problems Brother    • No Known Problems Sister    • No Known Problems Brother    • Breast cancer Neg Hx    • Colon cancer Neg Hx    • Ovarian cancer Neg Hx    • Cancer Neg Hx      I have reviewed and agree with the history as documented      E-Cigarette/Vaping   • E-Cigarette Use Never User      E-Cigarette/Vaping Substances     Social History     Tobacco Use   • Smoking status: Never   • Smokeless tobacco: Never   Vaping Use   • Vaping Use: Never used   Substance Use Topics   • Alcohol use: Yes     Comment: couple times/month   • Drug use: Not Currently     Types: Marijuana     Comment: last used marijuana 3/2021       Review of Systems   Constitutional: Negative for chills and fever  HENT: Positive for sore throat  Negative for ear pain  Eyes: Negative for pain and visual disturbance  Respiratory: Negative for cough and shortness of breath  Cardiovascular: Negative for chest pain and palpitations  Gastrointestinal: Negative for abdominal pain and vomiting  Genitourinary: Negative for dysuria and hematuria  Musculoskeletal: Negative for arthralgias and back pain  Skin: Negative for color change and rash  Neurological: Negative for seizures and syncope  All other systems reviewed and are negative  Physical Exam  Physical Exam  Vitals and nursing note reviewed  Constitutional:       General: She is not in acute distress  Appearance: She is well-developed  She is not ill-appearing, toxic-appearing or diaphoretic  HENT:      Head: Normocephalic and atraumatic  Right Ear: Tympanic membrane normal       Left Ear: Tympanic membrane normal       Mouth/Throat:      Mouth: Mucous membranes are moist  No oral lesions  Pharynx: Posterior oropharyngeal erythema present  No pharyngeal swelling, oropharyngeal exudate or uvula swelling  Tonsils: No tonsillar exudate or tonsillar abscesses  0 on the right  0 on the left  Eyes:      Extraocular Movements: Extraocular movements intact  Right eye: Normal extraocular motion  Left eye: Normal extraocular motion  Conjunctiva/sclera: Conjunctivae normal       Pupils: Pupils are equal, round, and reactive to light  Cardiovascular:      Rate and Rhythm: Normal rate and regular rhythm  Heart sounds: No murmur heard  Pulmonary:      Effort: Pulmonary effort is normal  No respiratory distress  Breath sounds: Normal breath sounds  No stridor   No wheezing, rhonchi or rales  Abdominal:      General: Bowel sounds are normal  There is no distension  Palpations: Abdomen is soft  There is no mass  Tenderness: There is no abdominal tenderness  Musculoskeletal:         General: No swelling  Cervical back: Neck supple  Lymphadenopathy:      Cervical: Cervical adenopathy present  Skin:     General: Skin is warm and dry  Capillary Refill: Capillary refill takes less than 2 seconds  Neurological:      Mental Status: She is alert  Psychiatric:         Mood and Affect: Mood normal          Vital Signs  ED Triage Vitals [01/03/23 1021]   Temperature Pulse Respirations Blood Pressure SpO2   97 8 °F (36 6 °C) 80 18 118/65 99 %      Temp src Heart Rate Source Patient Position - Orthostatic VS BP Location FiO2 (%)   -- -- Sitting Right arm --      Pain Score       --           Vitals:    01/03/23 1021   BP: 118/65   Pulse: 80   Patient Position - Orthostatic VS: Sitting         Visual Acuity      ED Medications  Medications - No data to display    Diagnostic Studies  Results Reviewed     Procedure Component Value Units Date/Time    Strep A PCR [[id number]]     Lab Status: No result Specimen: Throat     Chlamydia/GC amplified DNA by PCR [[id number]]     Lab Status: No result                  No orders to display              Procedures  Procedures         ED Course                               SBIRT 22yo+    Flowsheet Row Most Recent Value   SBIRT (25 yo +)    In order to provide better care to our patients, we are screening all of our patients for alcohol and drug use  Would it be okay to ask you these screening questions? Yes Filed at: 01/03/2023 1104   Initial Alcohol Screen: US AUDIT-C     1  How often do you have a drink containing alcohol? 1 Filed at: 01/03/2023 1104   2  How many drinks containing alcohol do you have on a typical day you are drinking? 1 Filed at: 01/03/2023 1104   3a  Male UNDER 65:  How often do you have five or more drinks on one occasion? 0 Filed at: 01/03/2023 1104   3b  FEMALE Any Age, or MALE 65+: How often do you have 4 or more drinks on one occassion? 1 Filed at: 01/03/2023 1104   Audit-C Score 3 Filed at: 01/03/2023 1104   MARCELO: How many times in the past year have you    Used an illegal drug or used a prescription medication for non-medical reasons? Never Filed at: 01/03/2023 1104                    Medical Decision Making  Katina Abel is a 25 y o  female with no significant past medical history presenting to the ER complaining of sore throat x3 days  Patient also like to get tested for gonorrhea and chlamydia as she is unsure if her boyfriend has been monogamous with her  On exam patient is well-appearing and in no acute distress  Vital signs are within normal limits  Exam reveals mild pharyngeal erythema and anterior cervical adenopathy  There is no pharyngeal exudates, tonsillar swelling, uvular edema or uvular deviation  Will order strep a PCR and GC chlamydia urine  Discussed with patient that should her results come back positive medications will be sent to the pharmacy  Advised the patient follow-up closely with OB/GYN  Advised the patient also follow-up with her PCP  Advised strict return precautions to the ED including but not limited to throat swelling, difficulty swallowing, difficulty breathing, abdominal pain, urinary symptoms, or any other new or concerning symptoms  Pharyngitis, unspecified etiology: acute illness or injury  Possible exposure to STD: acute illness or injury  Amount and/or Complexity of Data Reviewed  Labs: ordered  Decision-making details documented in ED Course  Risk  OTC drugs  Prescription drug management  Disposition  Final diagnoses:   None     ED Disposition     None      Follow-up Information    None         Patient's Medications   Discharge Prescriptions    No medications on file       No discharge procedures on file      PDMP Review     None          ED Provider  Electronically Signed by           Jaz Pfeiffer PA-C  01/03/23 4284

## 2023-01-04 LAB
C TRACH DNA SPEC QL NAA+PROBE: POSITIVE
N GONORRHOEA DNA SPEC QL NAA+PROBE: POSITIVE

## 2023-01-04 RX ORDER — DOXYCYCLINE HYCLATE 100 MG/1
100 CAPSULE ORAL 2 TIMES DAILY
Qty: 14 CAPSULE | Refills: 0 | Status: SHIPPED | OUTPATIENT
Start: 2023-01-04 | End: 2023-01-11

## 2023-01-04 RX ORDER — AZITHROMYCIN 500 MG/1
TABLET, FILM COATED ORAL
Qty: 4 TABLET | Refills: 0 | Status: SHIPPED | OUTPATIENT
Start: 2023-01-04 | End: 2023-01-08

## 2023-01-04 NOTE — RESULT ENCOUNTER NOTE
Spoke with patient advised positive chlamydia gonorrhea Zithromax 2 g and doxycycline 100 mg twice daily for 7 days was sent to her pharmacy    She was advised to speak with her partner for treatment avoid intercourse follow-up with PCP for further testing such as HIV and syphilis

## 2023-01-13 NOTE — PATIENT INSTRUCTIONS
Thank you for choosing us for your  care today  If you have any questions about your ultrasound or care, please do not hesitate to contact us or your primary obstetrician  Some general instructions for your pregnancy are:     Protect against coronavirus: get vaccinated and mask up  Pregnant women are increased risk of severe COVID  Notify your primary care doctor if you have any symptoms including cough, shortness of breath or difficulty breathing, fever, chills, muscle pain, sore throat, or loss of taste or smell   Exercise: Aim for 22 minutes per day (150 minutes per week) of regular exercise  Walking is great!  Nutrition: aim for calcium-rich and iron-rich foods as well as healthy sources of protein   Learn about Preeclampsia: preeclampsia is a common, serious high blood pressure complication in pregnancy  A blood pressure of 830DNTB (systolic or top number) or 65FGCL (diastolic or bottom number) is not normal and needs evaluation by your doctor  Aspirin is sometimes prescribed in early pregnancy to prevent preeclampsia in women with risk factors - ask your obstetrician if you should be on this medication   If you smoke, try to reduce how many cigarettes you smoke or try to quit completely  Do not vape   Other warning signs to watch out for in pregnancy or postpartum: chest pain, obstructed breathing or shortness of breath, seizures, thoughts of hurting yourself or your baby, bleeding, a painful or swollen leg, fever, or headache (see [employer] POST-BIRTH Warning Signs campaign)  If these happen call 911  Itching is also not normal in pregnancy and if you experience this, especially over your hands and feet, potentially worse at night, notify your doctors 
Statement Selected

## 2023-02-26 ENCOUNTER — HOSPITAL ENCOUNTER (EMERGENCY)
Facility: HOSPITAL | Age: 23
Discharge: HOME | End: 2023-02-26
Attending: EMERGENCY MEDICINE | Admitting: EMERGENCY MEDICINE

## 2023-02-26 VITALS
SYSTOLIC BLOOD PRESSURE: 121 MMHG | BODY MASS INDEX: 21.25 KG/M2 | DIASTOLIC BLOOD PRESSURE: 82 MMHG | HEART RATE: 90 BPM | WEIGHT: 116.18 LBS | RESPIRATION RATE: 18 BRPM | TEMPERATURE: 98.8 F | OXYGEN SATURATION: 100 %

## 2023-02-26 DIAGNOSIS — U07.1 COVID: Primary | ICD-10-CM

## 2023-02-26 RX ORDER — IBUPROFEN 600 MG/1
600 TABLET ORAL EVERY 6 HOURS PRN
Qty: 30 TABLET | Refills: 0 | Status: SHIPPED | OUTPATIENT
Start: 2023-02-26 | End: 2023-03-08

## 2023-02-26 RX ORDER — BENZONATATE 100 MG/1
100 CAPSULE ORAL 3 TIMES DAILY PRN
Qty: 20 CAPSULE | Refills: 0 | Status: SHIPPED | OUTPATIENT
Start: 2023-02-26 | End: 2023-03-05

## 2023-02-26 NOTE — Clinical Note
Chris Diaz was seen and treated in our emergency department on 2/26/2023  Diagnosis:     Deandre    She may return on this date: Your COVID test is positive  You need to quarantine in your house for a minimum of 7 days from symptom onset and you must be symptom free x 24 hrs prior to discontinuing your quarantine  Family members and others who you were in close contact with who are not immunized also need to quarantine for 10 days from their last exposure to you  Please FU with your family doctor  If you have any questions or concerns, please don't hesitate to call        Tali Jiménez PA-C    ______________________________           _______________          _______________  Hospital Representative                              Date                                Time

## 2023-02-26 NOTE — DISCHARGE INSTRUCTIONS
Your COVID test is positive  You need to quarantine in your house for a minimum of 7 days from symptom onset and you must be symptom free x 24 hrs prior to discontinuing your quarantine  Family members and others who you were in close contact with who are not immunized also need to quarantine for 10 days from their last exposure to you  Please FU with your family doctor

## 2023-02-26 NOTE — ED PROVIDER NOTES
History  Chief Complaint   Patient presents with   • Nasal Congestion     "I think I have covid  I took an at home test and it was positive " C/o congestion, cough  No medication for symptoms  Pt presents to the ED with cough and congestion since 2/23 - and states she has lost taste and smell as well  Took a home test + for covid  No GI upset  hasnt been going to work -with symtoms- needs note          Prior to Admission Medications   Prescriptions Last Dose Informant Patient Reported? Taking?   etonogestrel-ethinyl estradiol (EluRyng) 0 12-0 015 MG/24HR vaginal ring   No No   Sig: Insert 1 each into the vagina every 28 days Insert vaginally and leave in place for 3 consecutive weeks, then remove for 1 week  Facility-Administered Medications: None       Past Medical History:   Diagnosis Date   • Chlamydia 2021       Past Surgical History:   Procedure Laterality Date   • NO PAST SURGERIES         Family History   Problem Relation Age of Onset   • No Known Problems Mother    • No Known Problems Father    • No Known Problems Sister    • No Known Problems Brother    • No Known Problems Sister    • No Known Problems Brother    • Breast cancer Neg Hx    • Colon cancer Neg Hx    • Ovarian cancer Neg Hx    • Cancer Neg Hx      I have reviewed and agree with the history as documented  E-Cigarette/Vaping   • E-Cigarette Use Never User      E-Cigarette/Vaping Substances     Social History     Tobacco Use   • Smoking status: Never   • Smokeless tobacco: Never   Vaping Use   • Vaping Use: Never used   Substance Use Topics   • Alcohol use: Yes     Comment: couple times/month   • Drug use: Not Currently     Types: Marijuana     Comment: last used marijuana 3/2021       Review of Systems   Constitutional: Positive for fever  HENT: Positive for congestion  Negative for sore throat  Respiratory: Positive for cough  Negative for shortness of breath  Cardiovascular: Negative  Gastrointestinal: Negative  Genitourinary: Negative  All other systems reviewed and are negative  Physical Exam  Physical Exam  Vitals and nursing note reviewed  Constitutional:       Appearance: She is well-developed  HENT:      Head: Normocephalic and atraumatic  Right Ear: External ear normal       Left Ear: External ear normal    Eyes:      Conjunctiva/sclera: Conjunctivae normal    Cardiovascular:      Rate and Rhythm: Normal rate and regular rhythm  Heart sounds: Normal heart sounds  Pulmonary:      Effort: Pulmonary effort is normal       Breath sounds: Normal breath sounds  Abdominal:      Palpations: Abdomen is soft  Musculoskeletal:         General: Normal range of motion  Cervical back: Neck supple  Lymphadenopathy:      Cervical: No cervical adenopathy  Skin:     General: Skin is warm  Findings: No rash  Neurological:      Mental Status: She is alert  Coordination: Coordination normal       Gait: Gait normal    Psychiatric:         Behavior: Behavior normal          Vital Signs  ED Triage Vitals [02/26/23 1819]   Temperature Pulse Respirations Blood Pressure SpO2   98 8 °F (37 1 °C) 90 18 121/82 100 %      Temp Source Heart Rate Source Patient Position - Orthostatic VS BP Location FiO2 (%)   Oral -- Sitting Right arm --      Pain Score       --           Vitals:    02/26/23 1819   BP: 121/82   Pulse: 90   Patient Position - Orthostatic VS: Sitting         Visual Acuity      ED Medications  Medications - No data to display    Diagnostic Studies  Results Reviewed     None                 No orders to display              Procedures  Procedures         ED Course                                             Medical Decision Making  + covid test at home - no additional testing needed - no SOB or CP  Will treat symptoms  COVID: complicated acute illness or injury  Risk  OTC drugs  Prescription drug management            Disposition  Final diagnoses:   COVID     Time reflects when diagnosis was documented in both MDM as applicable and the Disposition within this note     Time User Action Codes Description Comment    2/26/2023  6:24 PM Tali Atkins Add [U07 1] Vaughnmiguel       ED Disposition     ED Disposition   Discharge    Condition   Stable    Date/Time   Sun Feb 26, 2023  6:24 PM    Comment   Farooq Redman discharge to home/self care  Follow-up Information     Follow up With Specialties Details Why Contact Info    [facility]    [phone]            Discharge Medication List as of 2/26/2023  6:26 PM      START taking these medications    Details   benzonatate (TESSALON PERLES) 100 mg capsule Take 1 capsule (100 mg total) by mouth 3 (three) times a day as needed for cough for up to 7 days, Starting Sun 2/26/2023, Until Sun 3/5/2023 at 2359, Normal      ibuprofen (MOTRIN) 600 mg tablet Take 1 tablet (600 mg total) by mouth every 6 (six) hours as needed for mild pain for up to 10 days, Starting Sun 2/26/2023, Until Wed 3/8/2023 at 2359, Normal         CONTINUE these medications which have NOT CHANGED    Details   etonogestrel-ethinyl estradiol (EluRyng) 0 12-0 015 MG/24HR vaginal ring Insert 1 each into the vagina every 28 days Insert vaginally and leave in place for 3 consecutive weeks, then remove for 1 week , Starting Thu 6/9/2022, Normal             No discharge procedures on file      PDMP Review     None          ED Provider  Electronically Signed by           Monique Menendez PA-C  02/26/23 1135

## 2023-05-17 ENCOUNTER — OFFICE VISIT (OUTPATIENT)
Dept: OBGYN CLINIC | Facility: CLINIC | Age: 23
End: 2023-05-17

## 2023-05-17 ENCOUNTER — PATIENT OUTREACH (OUTPATIENT)
Dept: OBGYN CLINIC | Facility: CLINIC | Age: 23
End: 2023-05-17

## 2023-05-17 VITALS
HEART RATE: 87 BPM | WEIGHT: 122 LBS | HEIGHT: 62 IN | SYSTOLIC BLOOD PRESSURE: 129 MMHG | BODY MASS INDEX: 22.45 KG/M2 | DIASTOLIC BLOOD PRESSURE: 83 MMHG

## 2023-05-17 DIAGNOSIS — Z32.01 POSITIVE PREGNANCY TEST: ICD-10-CM

## 2023-05-17 DIAGNOSIS — N92.6 LATE MENSES: Primary | ICD-10-CM

## 2023-05-17 DIAGNOSIS — Z64.0 UNWANTED PREGNANCY: ICD-10-CM

## 2023-05-17 LAB — SL AMB POCT URINE HCG: POSITIVE

## 2023-05-17 NOTE — PROGRESS NOTES
Pt came in for a [facility] consult today and found out she is pregnant  Pt in shock, she plans to terminate  She has a hx of one termination in March of 22 at [facility] and plans to call them to schedule, she is aware of the process for proceeding with a TOP  She had no questions, she declined the need for resources today  SW CM did let her know that I will call her for follow up next week

## 2023-05-17 NOTE — PROGRESS NOTES
"PROBLEM GYNECOLOGICAL VISIT    Nura Yanes is a 25 y o  female who presents today with complaint of unwanted fertility  Her general medical history has been reviewed and she reports it as follows:    Past Medical History:   Diagnosis Date   • Chlamydia      Past Surgical History:   Procedure Laterality Date   • NO PAST SURGERIES       OB History        2    Para   1    Term   1       0    AB   0    Living   1       SAB   0    IAB   0    Ectopic   0    Multiple   0    Live Births   1               Social History     Tobacco Use   • Smoking status: Never   • Smokeless tobacco: Never   Vaping Use   • Vaping Use: Never used   Substance Use Topics   • Alcohol use: Yes     Comment: couple times/month   • Drug use: Not Currently     Types: Marijuana     Comment: last used marijuana 3/2021     Social History     Substance and Sexual Activity   Sexual Activity Not Currently   • Partners: Male   • Birth control/protection: None       Current Medications:  none    History of Present Illness:   Reports that her medical insurance stopped in 2022 and she has been unable to obtain contraceptive ring since then  She now has insurance again and desires to resume again  She was very happy with the ring while she was using it  Reports LMP of 4/10/2023  Review of Systems:  Review of Systems   Constitutional: Negative  Gastrointestinal: Negative  Genitourinary: Positive for menstrual problem  Physical Exam:  /83   Pulse 87   Ht 5' 2\" (1 575 m)   Wt 55 3 kg (122 lb)   LMP 04/10/2023   BMI 22 31 kg/m²   Physical Exam  Constitutional:       General: She is not in acute distress  Abdominal:      Palpations: Abdomen is soft  Tenderness: There is no abdominal tenderness  Neurological:      Mental Status: She is alert  Skin:     General: Skin is warm and dry  Vitals reviewed  Point of Care Testing:   -urine pregnancy test: positive    Assessment:   1   Positive " pregnancy test    2  Unwanted pregnancy  Plan:   1   consultation/referral ordered as patient desires termination of pregnancy  2  Advised that if she changes her mind she should call to setup dating ultrasound and prenatal care  3  Return to office as previously scheduled for annual GYN exam next month  4  Patient's depression screening was assessed with a PHQ-2 score of 1  Their PHQ-9 score was 3  Clinically patient does not have depression  No treatment is required

## 2023-05-26 ENCOUNTER — PATIENT OUTREACH (OUTPATIENT)
Dept: OBGYN CLINIC | Facility: CLINIC | Age: 23
End: 2023-05-26

## 2023-05-26 NOTE — PROGRESS NOTES
BONITA Kaur f/u with the patient today to see if she had scheduled her pregnancy termination, she [facility] that [facility]   She has no concerns at this time  BONITA KAUR educated her that we would want to see her back in the office in 1-2 weeks for a birth control consultation and we will have the office staff call her to schedule next week

## 2023-05-30 ENCOUNTER — TELEPHONE (OUTPATIENT)
Dept: OBGYN CLINIC | Facility: CLINIC | Age: 23
End: 2023-05-30

## 2023-06-01 ENCOUNTER — NURSE TRIAGE (OUTPATIENT)
Dept: OTHER | Facility: OTHER | Age: 23
End: 2023-06-01

## 2023-06-01 NOTE — TELEPHONE ENCOUNTER
"Regarding: would like an appointment asap  ----- Message from Areli Sweeney sent at 6/1/2023  6:03 PM EDT -----  Pt called \" I would like to get an appointment asap to be checked after having unprotected sex  \"    "

## 2023-06-01 NOTE — TELEPHONE ENCOUNTER
"  Reason for Disposition  • Bad smelling vaginal discharge    Additional Information  • Symptoms of a Sexually Transmitted Disease or Infection (STD, STI)  • Vaginal discharge  • Pregnant    Answer Assessment - Initial Assessment Questions  1  SYMPTOMS: \"Do you have any symptoms of an STD? \" If so, ask: \"What are they? \"      Foul odor, brownish red d/c    2  TYPE: \"What STD do you have questions about? \"      BV    3  EXPOSURE: \"Were you exposed to an STD? \" If so, ask: \"When and what kind of exposure? \"      Patient reports that she had unprotected sex on 4/29/23    5  PREGNANCY: \"Is there any chance you are pregnant? \" \"When was your last menstrual period? \"      Yes, 4-5 weeks pregnant, is terminating pregnancy    Patient reports that she just wants to schedule an appointment to be checked for STDs  Informed patient that I will send this information to the office and someone will contact her tomorrow  Patient was appreciative      Protocols used: PREGNANCY - VAGINAL DISCHARGE-ADULT-, STD QUESTIONS-ADULT-, STDS - GUIDELINE SELECTION-ADULT-, VAGINAL DISCHARGE-ADULT-    "

## 2023-06-09 ENCOUNTER — PATIENT OUTREACH (OUTPATIENT)
Dept: OBGYN CLINIC | Facility: CLINIC | Age: 23
End: 2023-06-09

## 2023-06-09 ENCOUNTER — ANNUAL EXAM (OUTPATIENT)
Dept: OBGYN CLINIC | Facility: CLINIC | Age: 23
End: 2023-06-09

## 2023-06-09 VITALS
HEIGHT: 62 IN | SYSTOLIC BLOOD PRESSURE: 99 MMHG | DIASTOLIC BLOOD PRESSURE: 65 MMHG | WEIGHT: 121 LBS | BODY MASS INDEX: 22.26 KG/M2 | HEART RATE: 67 BPM

## 2023-06-09 DIAGNOSIS — Z30.09 UNWANTED FERTILITY: Primary | ICD-10-CM

## 2023-06-09 PROCEDURE — 99213 OFFICE O/P EST LOW 20 MIN: CPT | Performed by: NURSE PRACTITIONER

## 2023-06-09 RX ORDER — ETONOGESTREL AND ETHINYL ESTRADIOL 11.7; 2.7 MG/1; MG/1
INSERT, EXTENDED RELEASE VAGINAL
Qty: 1 EACH | Refills: 3 | Status: SHIPPED | OUTPATIENT
Start: 2023-06-09 | End: 2023-06-09 | Stop reason: SDUPTHER

## 2023-06-09 RX ORDER — ETONOGESTREL AND ETHINYL ESTRADIOL 11.7; 2.7 MG/1; MG/1
INSERT, EXTENDED RELEASE VAGINAL
Qty: 1 EACH | Refills: 12 | Status: SHIPPED | OUTPATIENT
Start: 2023-06-09

## 2023-06-09 NOTE — PROGRESS NOTES
Johnie Mcmillan presents today for annual GYN exam, but reports that she just had termination of pregnancy at [facility] via vacuum extraction on 6/3/2023  She reports that she currently has vaginal bleeding like a period  Denies pelvic pain  She desires to resume NuvaRing for contraception  On exam, abdomen is soft and nontender  Given Rx NuvaRing and instructed on appropriate administration    Return in 2 weeks for annual GYN exam

## 2023-06-09 NOTE — PROGRESS NOTES
Reviewed pts f/u visit today, PHQ-9 is negative for any depressive symptoms, will close this referral at this time as there are no social work needs

## 2023-06-09 NOTE — PATIENT INSTRUCTIONS
Thank you for your confidence in our team    We appreciate you and welcome your feedback  If you receive a survey from us, please take a few moments to let us know how we are doing     Sincerely,  Tam Stover

## 2023-06-23 ENCOUNTER — ANNUAL EXAM (OUTPATIENT)
Dept: OBGYN CLINIC | Facility: CLINIC | Age: 23
End: 2023-06-23

## 2023-06-23 VITALS
WEIGHT: 117.8 LBS | BODY MASS INDEX: 21.68 KG/M2 | DIASTOLIC BLOOD PRESSURE: 66 MMHG | HEART RATE: 59 BPM | SYSTOLIC BLOOD PRESSURE: 100 MMHG | HEIGHT: 62 IN

## 2023-06-23 DIAGNOSIS — Z12.39 ENCOUNTER FOR BREAST CANCER SCREENING USING NON-MAMMOGRAM MODALITY: ICD-10-CM

## 2023-06-23 DIAGNOSIS — Z11.3 SCREEN FOR STD (SEXUALLY TRANSMITTED DISEASE): ICD-10-CM

## 2023-06-23 DIAGNOSIS — Z12.4 SCREENING FOR CERVICAL CANCER: ICD-10-CM

## 2023-06-23 DIAGNOSIS — Z01.419 ENCOUNTER FOR GYNECOLOGICAL EXAMINATION WITHOUT ABNORMAL FINDING: Primary | ICD-10-CM

## 2023-06-23 PROCEDURE — 87491 CHLMYD TRACH DNA AMP PROBE: CPT | Performed by: NURSE PRACTITIONER

## 2023-06-23 PROCEDURE — 99395 PREV VISIT EST AGE 18-39: CPT | Performed by: NURSE PRACTITIONER

## 2023-06-23 PROCEDURE — 87591 N.GONORRHOEAE DNA AMP PROB: CPT | Performed by: NURSE PRACTITIONER

## 2023-06-23 PROCEDURE — G0145 SCR C/V CYTO,THINLAYER,RESCR: HCPCS | Performed by: NURSE PRACTITIONER

## 2023-06-23 NOTE — LETTER
2023    To Murray Aguayo  : 2000      This letter is to advise you that your recent CULTURES for gonorrhea and chlamydia were reviewed by me and are NORMAL  Please contact the office for an appointment if you have any additional concerns      [address]

## 2023-06-23 NOTE — LETTER
2023    To Jitendra THOMSON: 2000      This letter is to advise you that your recent PAP SMEAR results were reviewed by me and are NORMAL    We will see you in 1 year for your annual exam     Wil Philip

## 2023-06-23 NOTE — PROGRESS NOTES
"Marcela is a 25 y o  female who presents today for annual GYN exam   Her last pap smear was performed 2022 and result was unsatisfactory for evaluation  She reports menses as regular  Patient's last menstrual period was 2023  Her general medical history has been reviewed and she reports it as follows:    Past Medical History:   Diagnosis Date   • Chlamydia      Past Surgical History:   Procedure Laterality Date   • NO PAST SURGERIES       OB History        2    Para   1    Term   1       0    AB   1    Living   1       SAB   0    IAB   1    Ectopic   0    Multiple   0    Live Births   1               Social History     Tobacco Use   • Smoking status: Every Day     Types: E-Cigarettes   • Smokeless tobacco: Never   Substance Use Topics   • Alcohol use: Yes     Comment: couple times/month   • Drug use: Not Currently     Types: Marijuana     Comment: last used marijuana 3/2021     Social History     Substance and Sexual Activity   Sexual Activity Yes   • Partners: Male   • Birth control/protection: Ring     Cancer-related family history is negative for Breast cancer, Colon cancer, Ovarian cancer, and Cancer  Current Outpatient Medications   Medication Instructions   • etonogestrel-ethinyl estradiol (NuvaRing) 0 12-0 015 MG/24HR vaginal ring Insert vaginally and leave in place for 3 consecutive weeks, then remove for 1 week  Review of Systems:  Review of Systems   Constitutional: Negative  Gastrointestinal: Negative  Genitourinary: Negative for difficulty urinating, menstrual problem, pelvic pain and vaginal discharge  Skin: Negative  Physical Exam:  /66   Pulse 59   Ht 5' 2\" (1 575 m)   Wt 53 4 kg (117 lb 12 8 oz)   LMP 2023   BMI 21 55 kg/m²   Physical Exam  Constitutional:       General: She is not in acute distress  Appearance: She is well-developed     Genitourinary:      Vulva normal       No " lesions in the vagina  Right Adnexa: not tender and no mass present  Left Adnexa: not tender and no mass present  No cervical motion tenderness or lesion  Uterus is not tender  Breasts:     Right: No mass, nipple discharge, skin change or tenderness  Left: No mass, nipple discharge, skin change or tenderness  Neck:      Thyroid: No thyromegaly  Cardiovascular:      Rate and Rhythm: Normal rate and regular rhythm  Pulmonary:      Effort: Pulmonary effort is normal    Abdominal:      Palpations: Abdomen is soft  Tenderness: There is no abdominal tenderness  Musculoskeletal:      Cervical back: Neck supple  Neurological:      Mental Status: She is alert and oriented to person, place, and time  Skin:     General: Skin is warm and dry  Vitals reviewed  Assessment/Plan:   1  Normal well-woman GYN exam   2  Cervical cancer screening:  Normal cervical exam   Pap smear done with HPV reflex  Has received HPV vaccine in the past    3  STD screening:  Orders placed for vaginal GC/CT cultures  Orders placed for serum anti-HIV, anti-HCV, HbsAg, syphilis panel  4  Breast cancer screening:  Normal breast exam   Reviewed breast self-awareness  5  Depression Screening: Patient's depression screening was assessed with a PHQ-2 score of 0  Their PHQ-9 score was 0  Clinically patient does not have depression  No treatment is required  6  BMI Counseling: Body mass index is 21 55 kg/m²  No intervention indicated  7  Tobacco Cessation Counseling: Tobacco cessation counseling and education was provided  The patient is sincerely urged to quit consumption of tobacco  She is not ready to quit tobacco  The numerous health risks of tobacco consumption were discussed  If she decides to quit, there are a number of helpful adjunctive aids, and she can see me to discuss nicotine replacement therapy, chantix, or bupropion anytime in the future  8  Contraception:  NuvaRing   Has Rx refills for a year  9  Return to office in 1 year for annual GYN exam     Reviewed with patient that test results are available in [address] immediately, but that they will not necessarily be reviewed by me immediately  Explained that I will review results at my earliest opportunity and contact patient appropriately

## 2023-06-23 NOTE — PATIENT INSTRUCTIONS
Thank you for your confidence in our team    We appreciate you and welcome your feedback  If you receive a survey from us, please take a few moments to let us know how we are doing  Sincerely,  SERG Shaffer       Cigarette Smoking and Your Health     What are the risks to my health if I smoke tobacco?  Nicotine and other chemicals found in tobacco damage every cell in your body  Even if you are a light smoker, you have an increased risk for cancer, heart disease, and lung disease  If you are pregnant or have diabetes, smoking increases your risk for complications  What are the benefits to my health if I stop smoking? You decrease respiratory symptoms such as coughing, wheezing, and shortness of breath  You reduce your risk for cancers of the lung, mouth, throat, kidney, bladder, pancreas, stomach, and cervix  If you already have cancer, you increase the benefits of chemotherapy  You also reduce your risk for cancer returning or a second cancer from developing  You reduce your risk for heart disease, blood clots, heart attack, and stroke  You reduce your risk for lung infections, and diseases such as pneumonia, asthma, chronic bronchitis, and emphysema  Your circulation improves  More oxygen can be delivered to your body  If you have diabetes, you lower your risk for complications, such as kidney, artery, and eye diseases  You also lower your risk for nerve damage  Nerve damage can lead to amputations, poor vision, and blindness  You improve your body's ability to heal and to fight infections  What are the health benefits to others if I stop smoking? Tobacco is harmful to nonsmokers who breathe in your secondhand smoke  The following are ways the health of others around you may improve when you stop smoking: You lower the risks for lung cancer and heart disease in nonsmoking adults       If you are pregnant, you lower the risk for miscarriage, early delivery, low birth weight, and stillbirth  You also lower your baby's risk for SIDS, obesity, developmental delay, and neurobehavioral problems, such as ADHD  If you have children, you lower their risk for ear infections, colds, pneumonia, bronchitis, and asthma  How to Stop Smoking     You will improve your health and the health of others around you  if you stop smoking  Your risk for heart and lung disease, cancer, stroke, heart attack, and vision problems will also decrease  You can benefit from quitting no matter how long you have smoked  PREPARE to stop smoking  Nicotine is a highly addictive drug found in cigarettes  Withdrawal symptoms can happen when you stop smoking and make it hard to quit  These include anxiety, depression, irritability, trouble sleeping, and increased appetite  You increase your chances of success if you PREPARE to quit  Set a quit date  Sonny  a date that is within the next 2 weeks  Do not pick a day that you think may be stressful or busy  Write down the day or [place] it on your calender  Tell friends and family that you plan to quit  Explain that you may have withdrawal symptoms when you try to quit  Ask them to support you  They may be able to encourage you and help reduce your stress to make it easier for you to quit  Make a list of your reasons for quitting  Put the list somewhere you will see it every day, such as your refrigerator  You can look at the list when you have a craving  Remove all tobacco and nicotine products from your home, car, and workplace  Also, remove anything else that will tempt you to smoke, such as lighters, matches, or ashtrays  Clean your car, home, and places at work that smell like smoke  The smell of smoke can trigger a craving  Identify triggers that make you want to smoke  This may include activities, feelings, or people  Also write down 1 way you can deal with each of your triggers   For example, if you want to smoke as soon as you wake up, plan another activity during this time, such as exercise  Make a plan for how you will quit  Learn about the tools that can help you quit, such as medicine, counseling, or nicotine replacement therapy  Choose at least 2 options to help you quit  Tools to help you stop smoking:     Counseling  from a trained healthcare provider can provide you with support and skills to quit smoking  The provider will also teach you to manage your withdrawal symptoms and cravings  You may receive counseling from one counselor, in group therapy, or through phone therapy called a quit line  Nicotine replacement therapy (NRT)  such as nicotine patches, gum, or lozenges may help reduce your nicotine cravings  You may get these without a doctor's order  Do not use e-cigarettes or smokeless tobacco in place of cigarettes or to help you quit  They still contain nicotine  Prescription medicines  such as nasal sprays or nicotine inhalers may help reduce your withdrawal symptoms  Other medicines may also be used to reduce your urge to smoke  Ask your healthcare provider about these medicines  You may need to start certain medicines 2 weeks before your quit date for them to work well  Hypnosis  is a practice that helps guide you through thoughts and feelings  Hypnosis may help decrease your cravings and make you more willing to quit  Acupuncture therapy  uses very thin needles to balance energy channels in the body  This is thought to help decrease cravings and symptoms of nicotine withdrawal      Support groups  let you talk to others who are trying to quit or have already quit  It may be helpful to speak with others about how they quit  Manage your cravings:     Avoid situations, people, and places that tempt you to smoke  Go to nonsmoking places, such as libraries or restaurants  Understand what tempts you and try to avoid these things  Keep your hands busy  Hold things such as a stress ball or pen       Put candy or toothpicks in your mouth  Keep lollipops, sugarless gum, or toothpicks with you at all times  Do not have alcohol or caffeine  These drinks may tempt you to smoke  Drink healthy liquids such as water or juice instead  Reward yourself when you resist your cravings  Rewards will motivate you and help you stay positive  Do an activity that distracts you from your craving  Examples include going for a walk, exercising, or cleaning  Prevent weight gain after you quit:  You may gain a few pounds after you quit smoking  It is healthier for you to gain a few pounds than to continue to smoke  The following can help you prevent weight gain:    Eat healthy foods  These include fruits, vegetables, whole-grain breads, low-fat dairy products, beans, lean meats, and fish  Eat healthy snacks, such as low-fat yogurt, if you get hungry between meals  Drink water before, during, and between meals  This will make your stomach feel full and help prevent you from overeating  Ask your healthcare provider how much liquid to drink each day and which liquids are best for you  Exercise  Take a walk or do some kind of exercise every day  Ask your healthcare provider what exercise is right for you  This may help reduce your cravings and reduce stress

## 2023-06-27 LAB
C TRACH DNA SPEC QL NAA+PROBE: NEGATIVE
N GONORRHOEA DNA SPEC QL NAA+PROBE: NEGATIVE

## 2023-06-29 ENCOUNTER — HOSPITAL ENCOUNTER (EMERGENCY)
Facility: HOSPITAL | Age: 23
Discharge: HOME | End: 2023-06-29
Attending: EMERGENCY MEDICINE
Payer: MEDICARE

## 2023-06-29 VITALS
SYSTOLIC BLOOD PRESSURE: 111 MMHG | WEIGHT: 117 LBS | DIASTOLIC BLOOD PRESSURE: 68 MMHG | HEART RATE: 70 BPM | RESPIRATION RATE: 14 BRPM | TEMPERATURE: 97.1 F | BODY MASS INDEX: 21.4 KG/M2 | OXYGEN SATURATION: 99 %

## 2023-06-29 DIAGNOSIS — J30.2 SEASONAL ALLERGIES: ICD-10-CM

## 2023-06-29 DIAGNOSIS — R51.9 ACUTE NONINTRACTABLE HEADACHE, UNSPECIFIED HEADACHE TYPE: Primary | ICD-10-CM

## 2023-06-29 LAB
LAB AP GYN PRIMARY INTERPRETATION: NORMAL
Lab: NORMAL

## 2023-06-29 RX ORDER — ONDANSETRON 4 MG/1
4 TABLET, ORALLY DISINTEGRATING ORAL EVERY 6 HOURS PRN
Qty: 20 TABLET | Refills: 0 | Status: SHIPPED | OUTPATIENT
Start: 2023-06-29

## 2023-06-29 RX ORDER — CETIRIZINE HYDROCHLORIDE, PSEUDOEPHEDRINE HYDROCHLORIDE 5; 120 MG/1; MG/1
1 TABLET, FILM COATED, EXTENDED RELEASE ORAL 2 TIMES DAILY
Qty: 60 TABLET | Refills: 0 | Status: SHIPPED | OUTPATIENT
Start: 2023-06-29 | End: 2023-07-29

## 2023-06-29 RX ORDER — ACETAMINOPHEN 325 MG/1
975 TABLET ORAL ONCE
Status: COMPLETED | OUTPATIENT
Start: 2023-06-29 | End: 2023-06-29

## 2023-06-29 RX ORDER — ONDANSETRON 4 MG/1
4 TABLET, ORALLY DISINTEGRATING ORAL ONCE
Status: COMPLETED | OUTPATIENT
Start: 2023-06-29 | End: 2023-06-29

## 2023-06-29 RX ADMIN — ACETAMINOPHEN 975 MG: 325 TABLET ORAL at 21:34

## 2023-06-29 RX ADMIN — ONDANSETRON 4 MG: 4 TABLET, ORALLY DISINTEGRATING ORAL at 21:34

## 2023-06-29 NOTE — Clinical Note
Dominique Lowe was seen and treated in our emergency department on 6/29/2023  Diagnosis:     Lisa Sargent  may return to work on return date  She may return on this date: 06/30/2023         If you have any questions or concerns, please don't hesitate to call        Deandre Mahoney MD    ______________________________           _______________          _______________  Hospital Representative                              Date                                Time

## 2023-06-30 NOTE — ED PROVIDER NOTES
History  Chief Complaint   Patient presents with   • Headache     Fatigue, headache and nausea intermittently for 1 week     Patient is a 27-year-old female who presents with a 1 day h/o frontal headache and nausea  Did not take any medicine PTA, afraid would vomit it up  No h/o migraines  No HT  No numbness/tingling  Also c/o nasal congestion  Has allergies, not on meds  Also asking for a work note, did not go to work today  Prior to Admission Medications   Prescriptions Last Dose Informant Patient Reported? Taking?   etonogestrel-ethinyl estradiol (NuvaRing) 0 12-0 015 MG/24HR vaginal ring   No No   Sig: Insert vaginally and leave in place for 3 consecutive weeks, then remove for 1 week  Facility-Administered Medications: None       Past Medical History:   Diagnosis Date   • Chlamydia     2021, 2023   • Gonorrhea     2023       Past Surgical History:   Procedure Laterality Date   • NO PAST SURGERIES         Family History   Problem Relation Age of Onset   • No Known Problems Mother    • No Known Problems Father    • No Known Problems Sister    • No Known Problems Brother    • No Known Problems Sister    • No Known Problems Brother    • Breast cancer Neg Hx    • Colon cancer Neg Hx    • Ovarian cancer Neg Hx    • Cancer Neg Hx      I have reviewed and agree with the history as documented  E-Cigarette/Vaping   • E-Cigarette Use Current Every Day User      E-Cigarette/Vaping Substances   • Nicotine Yes      Social History     Tobacco Use   • Smoking status: Every Day     Types: E-Cigarettes   • Smokeless tobacco: Never   Vaping Use   • Vaping Use: Every day   • Substances: Nicotine   Substance Use Topics   • Alcohol use: Yes     Comment: couple times/month   • Drug use: Yes     Types: Marijuana       Review of Systems   Constitutional: Negative  HENT: Negative  Eyes: Negative  Respiratory: Negative  Cardiovascular: Negative  Gastrointestinal: Positive for nausea     Endocrine: Negative  Genitourinary: Negative  Musculoskeletal: Negative  Skin: Negative  Allergic/Immunologic: Negative  Neurological: Positive for headaches  Hematological: Negative  Psychiatric/Behavioral: Negative  All other systems reviewed and are negative  Physical Exam  Physical Exam  Vitals and nursing note reviewed  Constitutional:       Appearance: Normal appearance  She is normal weight  HENT:      Head: Normocephalic and atraumatic  Right Ear: Tympanic membrane, ear canal and external ear normal       Left Ear: Tympanic membrane, ear canal and external ear normal       Nose: Congestion present  No rhinorrhea  Mouth/Throat:      Mouth: Mucous membranes are moist       Pharynx: Oropharynx is clear  Eyes:      Extraocular Movements: Extraocular movements intact  Conjunctiva/sclera: Conjunctivae normal       Pupils: Pupils are equal, round, and reactive to light  Cardiovascular:      Rate and Rhythm: Normal rate and regular rhythm  Heart sounds: Normal heart sounds  Pulmonary:      Effort: Pulmonary effort is normal       Breath sounds: Normal breath sounds  Abdominal:      General: Bowel sounds are normal       Palpations: Abdomen is soft  Musculoskeletal:         General: Normal range of motion  Cervical back: Normal range of motion and neck supple  Skin:     General: Skin is warm and dry  Capillary Refill: Capillary refill takes less than 2 seconds  Neurological:      General: No focal deficit present  Mental Status: She is alert and oriented to person, place, and time  Cranial Nerves: No cranial nerve deficit  Sensory: No sensory deficit  Motor: No weakness        Coordination: Coordination normal       Gait: Gait normal       Deep Tendon Reflexes: Reflexes normal    Psychiatric:         Mood and Affect: Mood normal          Behavior: Behavior normal          Vital Signs  ED Triage Vitals [06/29/23 2119]   Temperature Pulse Respirations Blood Pressure SpO2   (!) 97 1 °F (36 2 °C) 70 14 111/68 99 %      Temp Source Heart Rate Source Patient Position - Orthostatic VS BP Location FiO2 (%)   Tympanic Monitor Sitting Left arm --      Pain Score       --           Vitals:    06/29/23 2119   BP: 111/68   Pulse: 70   Patient Position - Orthostatic VS: Sitting         Visual Acuity      ED Medications  Medications   acetaminophen (TYLENOL) tablet 975 mg (975 mg Oral Given 6/29/23 2134)   ondansetron (ZOFRAN-ODT) dispersible tablet 4 mg (4 mg Oral Given 6/29/23 2134)       Diagnostic Studies  Results Reviewed     None                 No orders to display              Procedures  Procedures         ED Course                                             Medical Decision Making  Acute nonintractable headache, unspecified headache type: acute illness or injury     Details: mild symptoms  no neuro deficits  no meds PTA  Seasonal allergies:     Details: h/o alleriges  not on meds  will prescribe  Risk  OTC drugs  Prescription drug management  Disposition  Final diagnoses:   Acute nonintractable headache, unspecified headache type   Seasonal allergies     Time reflects when diagnosis was documented in both MDM as applicable and the Disposition within this note     Time User Action Codes Description Comment    6/29/2023  9:30 PM Mike [place] Add [R51 9] Acute nonintractable headache, unspecified headache type     6/29/2023  9:31 PM Mike [place] Add [J30 2] Seasonal allergies       ED Disposition     ED Disposition   Discharge    Condition   Stable    Date/Time   Thu Jun 29, 2023  9:30 PM    Comment   Ronaldo Chaudhary discharge to home/self care                 Follow-up Information     Follow up With Specialties Details Why Contact Info Additional [address]   [address], [address] [facility] [address]  [address] [place] [address] [address], [address], [address] [place], [place], [address]          Discharge Medication List as of 6/29/2023  9:31 PM      START taking these medications    Details   cetirizine-pseudoephedrine (ZyrTEC-D) 5-120 MG per tablet Take 1 tablet by mouth 2 (two) times a day, Starting Thu 6/29/2023, Until Sat 7/29/2023, Normal      ondansetron (Zofran ODT) 4 mg disintegrating tablet Take 1 tablet (4 mg total) by mouth every 6 (six) hours as needed for nausea or vomiting, Starting Thu 6/29/2023, Normal         CONTINUE these medications which have NOT CHANGED    Details   etonogestrel-ethinyl estradiol (NuvaRing) 0 12-0 015 MG/24HR vaginal ring Insert vaginally and leave in place for 3 consecutive weeks, then remove for 1 week , Normal             No discharge procedures on file      PDMP Review     None          ED Provider  Electronically Signed by           Rebecca Perdue MD  06/29/23 2678

## 2023-09-07 ENCOUNTER — HOSPITAL ENCOUNTER (EMERGENCY)
Facility: HOSPITAL | Age: 23
Discharge: HOME | End: 2023-09-07
Attending: EMERGENCY MEDICINE
Payer: MEDICARE

## 2023-09-07 VITALS
DIASTOLIC BLOOD PRESSURE: 76 MMHG | SYSTOLIC BLOOD PRESSURE: 131 MMHG | RESPIRATION RATE: 18 BRPM | BODY MASS INDEX: 23.3 KG/M2 | OXYGEN SATURATION: 96 % | WEIGHT: 127.4 LBS | HEART RATE: 98 BPM | TEMPERATURE: 98.8 F

## 2023-09-07 DIAGNOSIS — R11.0 NAUSEA: Primary | ICD-10-CM

## 2023-09-07 LAB
EXT PREGNANCY TEST URINE: NEGATIVE
EXT. CONTROL: NORMAL

## 2023-09-07 PROCEDURE — 99283 EMERGENCY DEPT VISIT LOW MDM: CPT

## 2023-09-07 PROCEDURE — 99284 EMERGENCY DEPT VISIT MOD MDM: CPT | Performed by: EMERGENCY MEDICINE

## 2023-09-07 PROCEDURE — 81025 URINE PREGNANCY TEST: CPT | Performed by: EMERGENCY MEDICINE

## 2023-09-07 RX ORDER — ONDANSETRON 4 MG/1
4 TABLET, ORALLY DISINTEGRATING ORAL ONCE
Status: COMPLETED | OUTPATIENT
Start: 2023-09-07 | End: 2023-09-07

## 2023-09-07 RX ADMIN — ONDANSETRON 4 MG: 4 TABLET, ORALLY DISINTEGRATING ORAL at 15:12

## 2023-09-07 NOTE — ED PROVIDER NOTES
History  Chief Complaint   Patient presents with   • Flu Symptoms     Reports nausea, headache, vomiting, and feeling like she was having hot flashes since yesterday. 27-year-old female, presents with nausea. Patient states she had vomiting yesterday, none today but still feels nauseous. Denies any fevers. Denies any associated abdominal pain, diarrhea, or urinary symptoms. History provided by:  Patient   used: No    Flu Symptoms  Presenting symptoms: headache, myalgias and nausea    Presenting symptoms: no fever        Prior to Admission Medications   Prescriptions Last Dose Informant Patient Reported? Taking?   etonogestrel-ethinyl estradiol (NuvaRing) 0.12-0.015 MG/24HR vaginal ring   No No   Sig: Insert vaginally and leave in place for 3 consecutive weeks, then remove for 1 week. ondansetron (Zofran ODT) 4 mg disintegrating tablet   No No   Sig: Take 1 tablet (4 mg total) by mouth every 6 (six) hours as needed for nausea or vomiting      Facility-Administered Medications: None       Past Medical History:   Diagnosis Date   • Chlamydia     2021, 2023   • Gonorrhea     2023       Past Surgical History:   Procedure Laterality Date   • NO PAST SURGERIES         Family History   Problem Relation Age of Onset   • No Known Problems Mother    • No Known Problems Father    • No Known Problems Sister    • No Known Problems Brother    • No Known Problems Sister    • No Known Problems Brother    • Breast cancer Neg Hx    • Colon cancer Neg Hx    • Ovarian cancer Neg Hx    • Cancer Neg Hx      I have reviewed and agree with the history as documented. E-Cigarette/Vaping   • E-Cigarette Use Current Every Day User      E-Cigarette/Vaping Substances   • Nicotine Yes      Social History     Tobacco Use   • Smoking status: Every Day     Types: E-Cigarettes   • Smokeless tobacco: Never   Vaping Use   • Vaping Use: Every day   • Substances: Nicotine   Substance Use Topics   • Alcohol use:  Yes Comment: couple times/month   • Drug use: Yes     Types: Marijuana       Review of Systems   Constitutional: Negative for fever. Respiratory: Negative. Cardiovascular: Negative. Gastrointestinal: Positive for nausea. Genitourinary: Negative. Musculoskeletal: Positive for myalgias. Neurological: Positive for headaches. Physical Exam  Physical Exam  Vitals and nursing note reviewed. Constitutional:       General: She is not in acute distress. HENT:      Head: Normocephalic and atraumatic. Mouth/Throat:      Mouth: Mucous membranes are moist.      Pharynx: Oropharynx is clear. No oropharyngeal exudate or posterior oropharyngeal erythema. Eyes:      Extraocular Movements: Extraocular movements intact. Conjunctiva/sclera: Conjunctivae normal.      Pupils: Pupils are equal, round, and reactive to light. Cardiovascular:      Rate and Rhythm: Normal rate and regular rhythm. Pulmonary:      Effort: Pulmonary effort is normal.      Breath sounds: Normal breath sounds. Abdominal:      Palpations: Abdomen is soft. Tenderness: There is no abdominal tenderness. Musculoskeletal:      Cervical back: Normal range of motion and neck supple. No rigidity. Skin:     General: Skin is warm and dry. Neurological:      General: No focal deficit present. Mental Status: She is alert and oriented to person, place, and time. Motor: No weakness.       Gait: Gait normal.         Vital Signs  ED Triage Vitals [09/07/23 1451]   Temperature Pulse Respirations Blood Pressure SpO2   98.8 °F (37.1 °C) 98 18 131/76 96 %      Temp Source Heart Rate Source Patient Position - Orthostatic VS BP Location FiO2 (%)   Tympanic Monitor Sitting Left arm --      Pain Score       --           Vitals:    09/07/23 1451   BP: 131/76   Pulse: 98   Patient Position - Orthostatic VS: Sitting         Visual Acuity      ED Medications  Medications   ondansetron (ZOFRAN-ODT) dispersible tablet 4 mg (4 mg Oral Given 9/7/23 1512)       Diagnostic Studies  Results Reviewed     Procedure Component Value Units Date/Time    POCT pregnancy, urine [[id number]]  (Normal) Resulted: 09/07/23 1513    Lab Status: Final result Updated: 09/07/23 1513     EXT Preg Test, Ur Negative     Control Valid                 No orders to display              Procedures  Procedures         ED Course  ED Course as of 09/07/23 1525   Thu Sep 07, 2023   1525 Patient comfortable, feels well to be discharged. Instructed to stay well-hydrated, drink small amounts of fluids frequently. Instructed to follow-up for return for any worsening or new concerning symptoms. SBIRT 20yo+    Flowsheet Row Most Recent Value   Initial Alcohol Screen: US AUDIT-C     1. How often do you have a drink containing alcohol? 1 Filed at: 09/07/2023 1452   2. How many drinks containing alcohol do you have on a typical day you are drinking? 1 Filed at: 09/07/2023 1452   3b. FEMALE Any Age, or MALE 65+: How often do you have 4 or more drinks on one occassion? 0 Filed at: 09/07/2023 1452   Audit-C Score 2 Filed at: 09/07/2023 1452   MARCELO: How many times in the past year have you. .. Used an illegal drug or used a prescription medication for non-medical reasons? Never Filed at: 09/07/2023 1452                    Medical Decision Making  80-year-old female, presents with nausea. Differential diagnosis includes gastritis, viral illness, pregnancy among other diagnoses. Patient looks well, appears well-hydrated, abdomen soft and nontender. Patient states she has not vomited today, has tolerated oral intake today. Will give antiemetic medication, check urine pregnancy. Amount and/or Complexity of Data Reviewed  Labs: ordered. Decision-making details documented in ED Course. Risk  Prescription drug management.           Disposition  Final diagnoses:   Nausea     Time reflects when diagnosis was documented in both MDM as applicable and the Disposition within this note     Time User Action Codes Description Comment    9/7/2023  3:22 PM Gayroyce Necessary Add [R11.0] Nausea       ED Disposition     ED Disposition   Discharge    Condition   Stable    Date/Time   Thu Sep 7, 2023  3:22 PM    Comment   Essie Plain discharge to home/self care. Follow-up Information    None         Discharge Medication List as of 9/7/2023  3:23 PM      CONTINUE these medications which have NOT CHANGED    Details   etonogestrel-ethinyl estradiol (NuvaRing) 0.12-0.015 MG/24HR vaginal ring Insert vaginally and leave in place for 3 consecutive weeks, then remove for 1 week., Normal      ondansetron (Zofran ODT) 4 mg disintegrating tablet Take 1 tablet (4 mg total) by mouth every 6 (six) hours as needed for nausea or vomiting, Starting Thu 6/29/2023, Normal             No discharge procedures on file.     PDMP Review     None          ED Provider  Electronically Signed by           Neha Abreu MD  09/07/23 0584

## 2023-09-07 NOTE — Clinical Note
Torsten Ley was seen and treated in our emergency department on 9/7/2023. Diagnosis:     Anisja  . She may return on this date: 09/08/2023         If you have any questions or concerns, please don't hesitate to call.       Ghazala Spears MD    ______________________________           _______________          _______________  Hospital Representative                              Date                                Time

## 2023-10-20 ENCOUNTER — OFFICE VISIT (OUTPATIENT)
Dept: OBGYN CLINIC | Facility: CLINIC | Age: 23
End: 2023-10-20

## 2023-10-20 ENCOUNTER — APPOINTMENT (OUTPATIENT)
Dept: LAB | Facility: CLINIC | Age: 23
End: 2023-10-20
Payer: MEDICARE

## 2023-10-20 VITALS
WEIGHT: 127.6 LBS | DIASTOLIC BLOOD PRESSURE: 77 MMHG | HEART RATE: 84 BPM | BODY MASS INDEX: 23.48 KG/M2 | HEIGHT: 62 IN | SYSTOLIC BLOOD PRESSURE: 111 MMHG

## 2023-10-20 DIAGNOSIS — Z11.3 SCREEN FOR STD (SEXUALLY TRANSMITTED DISEASE): ICD-10-CM

## 2023-10-20 DIAGNOSIS — B37.9 YEAST INFECTION: Primary | ICD-10-CM

## 2023-10-20 DIAGNOSIS — R39.9 UTI SYMPTOMS: ICD-10-CM

## 2023-10-20 LAB
BV WHIFF TEST VAG QL: NEGATIVE
CLUE CELLS SPEC QL WET PREP: NEGATIVE
PH SMN: 4.5 [PH]
SL AMB  POCT GLUCOSE, UA: NORMAL
SL AMB LEUKOCYTE ESTERASE,UA: NORMAL
SL AMB POCT BILIRUBIN,UA: NORMAL
SL AMB POCT BLOOD,UA: NORMAL
SL AMB POCT CLARITY,UA: NORMAL
SL AMB POCT COLOR,UA: YELLOW
SL AMB POCT KETONES,UA: NORMAL
SL AMB POCT NITRITE,UA: NORMAL
SL AMB POCT PH,UA: 5
SL AMB POCT SPECIFIC GRAVITY,UA: 1.02
SL AMB POCT URINE PROTEIN: NORMAL
SL AMB POCT UROBILINOGEN: 0.2
SL AMB POCT WET MOUNT: ABNORMAL
T VAGINALIS VAG QL WET PREP: NEGATIVE
YEAST VAG QL WET PREP: POSITIVE

## 2023-10-20 PROCEDURE — 87491 CHLMYD TRACH DNA AMP PROBE: CPT | Performed by: NURSE PRACTITIONER

## 2023-10-20 PROCEDURE — 87389 HIV-1 AG W/HIV-1&-2 AB AG IA: CPT

## 2023-10-20 PROCEDURE — 87591 N.GONORRHOEAE DNA AMP PROB: CPT | Performed by: NURSE PRACTITIONER

## 2023-10-20 PROCEDURE — 87340 HEPATITIS B SURFACE AG IA: CPT

## 2023-10-20 PROCEDURE — 86803 HEPATITIS C AB TEST: CPT

## 2023-10-20 PROCEDURE — 86780 TREPONEMA PALLIDUM: CPT

## 2023-10-20 PROCEDURE — 36415 COLL VENOUS BLD VENIPUNCTURE: CPT

## 2023-10-20 RX ORDER — FLUCONAZOLE 150 MG/1
150 TABLET ORAL ONCE
Qty: 1 TABLET | Refills: 1 | Status: SHIPPED | OUTPATIENT
Start: 2023-10-20 | End: 2023-10-20

## 2023-10-20 NOTE — PROGRESS NOTES
Assessment/Plan:     Diagnoses and all orders for this visit:    Yeast infection  -     POCT wet mount    Screen for STD (sexually transmitted disease)  -     Chlamydia/GC amplified DNA by PCR    UTI symptoms  -     POCT urine dip  -     fluconazole (DIFLUCAN) 150 mg tablet; Take 1 tablet (150 mg total) by mouth once for 1 dose      Plan  Take Fluconazole as directed   Wear loose clothes and cotton underwear  Complete STD testing previously ordered  STD results can take up to 2 weeks  Remember safe sex and condom use  Annual GYN exam is due after 6/24/2024  Call with needs or concerns  Pt verbalized understanding of all discussed. Subjective:      Patient ID: Ginna Beth is a 25 y.o. female. HPI  Pt C/O vaginal discharge and intermittent vular itching x 4 days  6/23/2023 WNL PAP    Explained wet mount was positive for yeast, safe and effective use of Fluconazole was provided, discussed comfort measures  Reinforced safe sex and condom use    Depression Screening Follow-up Plan: Patient's depression screening was positive with a PHQ-2 score of 0. Their PHQ-9 score was 1. Clinically patient does not have depression. No treatment is required. The following portions of the patient's history were reviewed and updated as appropriate: allergies, current medications, past family history, past medical history, past social history, past surgical history and problem list.    Review of Systems    . Pertinent items are note in the HPI      Objective:      /77   Pulse 84   Ht 5' 2" (1.575 m)   Wt 57.9 kg (127 lb 9.6 oz)   LMP 09/30/2023   BMI 23.34 kg/m²          Physical Exam    Alert and oriented  Denies pain  WNL respiratory effort, negative cough or SOB  Vulva negative lesions, slight erythema  Vagina erythema, positive thick white discharge   Cervix positive thick white discharge, negative lesions  Wet mount positive for yeast

## 2023-10-20 NOTE — PATIENT INSTRUCTIONS
Take Fluconazole as directed   Wear loose clothes and cotton underwear  Complete STD testing previously ordered  STD results can take up to 2 weeks  Remember safe sex and condom use  Annual GYN exam is due after 6/24/2024  Call with needs or concerns

## 2023-10-21 LAB
HBV SURFACE AG SER QL: NORMAL
HCV AB SER QL: NORMAL
HIV 1+2 AB+HIV1 P24 AG SERPL QL IA: NORMAL
HIV 2 AB SERPL QL IA: NORMAL
HIV1 AB SERPL QL IA: NORMAL
HIV1 P24 AG SERPL QL IA: NORMAL
TREPONEMA PALLIDUM IGG+IGM AB [PRESENCE] IN SERUM OR PLASMA BY IMMUNOASSAY: NORMAL

## 2023-10-23 LAB
C TRACH DNA SPEC QL NAA+PROBE: POSITIVE
N GONORRHOEA DNA SPEC QL NAA+PROBE: POSITIVE

## 2023-10-24 DIAGNOSIS — A74.9 CHLAMYDIA INFECTION: Primary | ICD-10-CM

## 2023-10-24 PROBLEM — A54.9 GONORRHEA: Status: ACTIVE | Noted: 2023-10-24

## 2023-10-24 RX ORDER — CEFTRIAXONE 500 MG/1
500 INJECTION, POWDER, FOR SOLUTION INTRAMUSCULAR; INTRAVENOUS ONCE
Status: COMPLETED | OUTPATIENT
Start: 2023-10-24 | End: 2023-10-25

## 2023-10-24 RX ORDER — DOXYCYCLINE 100 MG/1
100 TABLET ORAL 2 TIMES DAILY
Qty: 14 TABLET | Refills: 0 | Status: SHIPPED | OUTPATIENT
Start: 2023-10-24 | End: 2023-10-31

## 2023-10-25 ENCOUNTER — CLINICAL SUPPORT (OUTPATIENT)
Dept: OBGYN CLINIC | Facility: CLINIC | Age: 23
End: 2023-10-25

## 2023-10-25 VITALS
DIASTOLIC BLOOD PRESSURE: 80 MMHG | SYSTOLIC BLOOD PRESSURE: 113 MMHG | WEIGHT: 126.6 LBS | HEART RATE: 58 BPM | BODY MASS INDEX: 23.16 KG/M2

## 2023-10-25 DIAGNOSIS — A54.9 GONORRHEA: Primary | ICD-10-CM

## 2023-10-25 PROCEDURE — 96372 THER/PROPH/DIAG INJ SC/IM: CPT

## 2023-10-25 RX ORDER — LIDOCAINE HYDROCHLORIDE 10 MG/ML
1.9 INJECTION, SOLUTION EPIDURAL; INFILTRATION; INTRACAUDAL; PERINEURAL ONCE
Status: COMPLETED | OUTPATIENT
Start: 2023-10-25 | End: 2023-10-25

## 2023-10-25 RX ORDER — CEFTRIAXONE 500 MG/1
500 INJECTION, POWDER, FOR SOLUTION INTRAMUSCULAR; INTRAVENOUS ONCE
Status: DISCONTINUED | OUTPATIENT
Start: 2023-10-25 | End: 2023-11-02

## 2023-10-25 RX ADMIN — CEFTRIAXONE 500 MG: 500 INJECTION, POWDER, FOR SOLUTION INTRAMUSCULAR; INTRAVENOUS at 08:30

## 2023-10-25 RX ADMIN — LIDOCAINE HYDROCHLORIDE 1.9 ML: 10 INJECTION, SOLUTION EPIDURAL; INFILTRATION; INTRACAUDAL; PERINEURAL at 09:32

## 2023-10-25 NOTE — PROGRESS NOTES
Pt here for ceftriaxone injection. Given in the right buttocks.        Ceftriaxone   NDC# 28414-217-93  AJS#[id number]  NCE#[id number]    Lidocaine    NDC# 13915-215-33  SQR#[id number]  [facility]#[id number]

## 2023-11-05 NOTE — TELEPHONE ENCOUNTER
Pt had an appt at 2:30 arrived at 3pm, being a 1/2 hour late she did state she was trying to call even though I did not receive a phone call from her, I was willing to lizzy her, but she told me that she would be calling me back to lizzy, I did apologize to her that we only allow 15mins to be late and after the 15mins it needs to be approved  4

## 2024-05-14 ENCOUNTER — TELEPHONE (OUTPATIENT)
Dept: OBGYN CLINIC | Facility: CLINIC | Age: 24
End: 2024-05-14

## 2024-06-08 ENCOUNTER — HOSPITAL ENCOUNTER (EMERGENCY)
Facility: HOSPITAL | Age: 24
Discharge: HOME | End: 2024-06-08
Attending: EMERGENCY MEDICINE
Payer: MEDICARE

## 2024-06-08 VITALS
RESPIRATION RATE: 16 BRPM | DIASTOLIC BLOOD PRESSURE: 58 MMHG | TEMPERATURE: 98.4 F | HEART RATE: 69 BPM | SYSTOLIC BLOOD PRESSURE: 123 MMHG | OXYGEN SATURATION: 98 %

## 2024-06-08 DIAGNOSIS — K04.7 DENTAL INFECTION: Primary | ICD-10-CM

## 2024-06-08 PROCEDURE — 99282 EMERGENCY DEPT VISIT SF MDM: CPT

## 2024-06-08 PROCEDURE — 99284 EMERGENCY DEPT VISIT MOD MDM: CPT | Performed by: PHYSICIAN ASSISTANT

## 2024-06-08 RX ORDER — AMOXICILLIN AND CLAVULANATE POTASSIUM 875; 125 MG/1; MG/1
1 TABLET, FILM COATED ORAL EVERY 12 HOURS
Qty: 14 TABLET | Refills: 0 | Status: SHIPPED | OUTPATIENT
Start: 2024-06-08 | End: 2024-06-15

## 2024-06-08 RX ORDER — AMOXICILLIN AND CLAVULANATE POTASSIUM 875; 125 MG/1; MG/1
1 TABLET, FILM COATED ORAL ONCE
Status: COMPLETED | OUTPATIENT
Start: 2024-06-08 | End: 2024-06-08

## 2024-06-08 RX ORDER — CHLORHEXIDINE GLUCONATE ORAL RINSE 1.2 MG/ML
15 SOLUTION DENTAL 2 TIMES DAILY
Qty: 120 ML | Refills: 0 | Status: SHIPPED | OUTPATIENT
Start: 2024-06-08

## 2024-06-08 RX ADMIN — AMOXICILLIN AND CLAVULANATE POTASSIUM 1 TABLET: 875; 125 TABLET, FILM COATED ORAL at 13:27

## 2024-06-08 NOTE — DISCHARGE INSTRUCTIONS
Please return to the emergency department for worsening symptoms including chest pain, shortness of breath, dizziness, lightheadedness, fever greater than 103, severe pain, inability to walk, fainting episodes, etc..  Please follow-up with your family practice provider as soon as possible.  I have sent medications over to the pharmacy for your symptoms.  Please take as directed.  Follow-up with your dentist.  Return to the emergency department for facial swelling or worsening pain.  I have given you a list of dentists in the area that you can follow-up with.

## 2024-06-09 NOTE — ED PROVIDER NOTES
History  Chief Complaint   Patient presents with    Dental Pain     Pt reports R sided lower dental pain worsening over 2 months, pt established with dentist already, has appt to remove the infected tooth for the end of July. Pt took motrin and tylenol without relief     This is a 23-year-old female presenting to the emergency department today for right lower dental pain.  It has been worsening over the past 2 months.  The patient has already established herself with a dentist who notes that she will likely need a tooth pulled.  She has an appointment sometime in July to have this tooth evaluated.  The patient notes pain to the right lower dentition but no associated facial swelling.  She has no fevers or chills.  No other dental pain.  She has taken Tylenol and Motrin without pain relief.  She is requesting an antibiotic.  She has no other complaints at this time.      History provided by:  Patient   used: No    Dental Pain  Location:  Lower  Quality:  Aching  Severity:  Mild  Onset quality:  Gradual  Duration:  2 months  Timing:  Intermittent  Progression:  Waxing and waning  Chronicity:  Recurrent  Relieved by:  Nothing  Worsened by:  Nothing  Ineffective treatments:  Acetaminophen and NSAIDs  Associated symptoms: no congestion, no difficulty swallowing, no drooling, no facial pain, no facial swelling, no fever, no gum swelling, no headaches, no neck pain, no neck swelling, no oral bleeding, no oral lesions and no trismus    Risk factors: periodontal disease        Prior to Admission Medications   Prescriptions Last Dose Informant Patient Reported? Taking?   etonogestrel-ethinyl estradiol (NuvaRing) 0.12-0.015 MG/24HR vaginal ring   No No   Sig: Insert vaginally and leave in place for 3 consecutive weeks, then remove for 1 week.   ondansetron (Zofran ODT) 4 mg disintegrating tablet   No No   Sig: Take 1 tablet (4 mg total) by mouth every 6 (six) hours as needed for nausea or vomiting   Patient  not taking: Reported on 10/20/2023      Facility-Administered Medications: None       Past Medical History:   Diagnosis Date    Chlamydia     2021, 2023    Gonorrhea     2023       Past Surgical History:   Procedure Laterality Date    NO PAST SURGERIES         Family History   Problem Relation Age of Onset    No Known Problems Mother     No Known Problems Father     No Known Problems Sister     No Known Problems Brother     No Known Problems Sister     No Known Problems Brother     Breast cancer Neg Hx     Colon cancer Neg Hx     Ovarian cancer Neg Hx     Cancer Neg Hx      I have reviewed and agree with the history as documented.    E-Cigarette/Vaping    E-Cigarette Use Current Every Day User      E-Cigarette/Vaping Substances    Nicotine Yes      Social History     Tobacco Use    Smoking status: Every Day     Types: E-Cigarettes    Smokeless tobacco: Never   Vaping Use    Vaping status: Every Day    Substances: Nicotine   Substance Use Topics    Alcohol use: Yes     Comment: couple times/month    Drug use: Yes     Types: Marijuana     Comment: occ       Review of Systems   Constitutional:  Negative for appetite change, chills, diaphoresis, fatigue and fever.   HENT:  Positive for dental problem. Negative for congestion, drooling, facial swelling, mouth sores, rhinorrhea, sinus pressure, sinus pain and sore throat.    Respiratory:  Negative for cough, chest tightness, shortness of breath and wheezing.    Cardiovascular:  Negative for chest pain, palpitations and leg swelling.   Gastrointestinal:  Negative for abdominal pain, constipation, diarrhea, nausea and vomiting.   Musculoskeletal:  Negative for neck pain and neck stiffness.   Skin:  Negative for rash and wound.   Neurological:  Negative for dizziness, seizures, syncope, weakness, light-headedness, numbness and headaches.   Psychiatric/Behavioral:  Negative for confusion.    All other systems reviewed and are negative.      Physical Exam  Physical  Exam  Vitals and nursing note reviewed.   Constitutional:       General: She is not in acute distress.     Appearance: Normal appearance. She is normal weight. She is not ill-appearing, toxic-appearing or diaphoretic.   HENT:      Head: Normocephalic and atraumatic.      Nose: Nose normal. No congestion or rhinorrhea.      Mouth/Throat:      Mouth: Mucous membranes are moist.      Pharynx: No oropharyngeal exudate or posterior oropharyngeal erythema.        Comments: No facial swelling  Eyes:      General: No scleral icterus.        Right eye: No discharge.         Left eye: No discharge.      Extraocular Movements: Extraocular movements intact.      Pupils: Pupils are equal, round, and reactive to light.   Cardiovascular:      Rate and Rhythm: Normal rate and regular rhythm.      Pulses: Normal pulses.      Heart sounds: Normal heart sounds. No murmur heard.     No friction rub. No gallop.   Pulmonary:      Effort: Pulmonary effort is normal. No respiratory distress.      Breath sounds: Normal breath sounds. No stridor. No wheezing, rhonchi or rales.   Chest:      Chest wall: No tenderness.   Musculoskeletal:         General: Normal range of motion.      Cervical back: Normal range of motion. No tenderness.      Right lower leg: No edema.      Left lower leg: No edema.   Skin:     General: Skin is warm and dry.      Capillary Refill: Capillary refill takes less than 2 seconds.      Coloration: Skin is not jaundiced or pale.   Neurological:      General: No focal deficit present.      Mental Status: She is alert and oriented to person, place, and time. Mental status is at baseline.   Psychiatric:         Mood and Affect: Mood normal.         Behavior: Behavior normal.         Vital Signs  ED Triage Vitals   Temperature Pulse Respirations Blood Pressure SpO2   06/08/24 1307 06/08/24 1308 06/08/24 1308 06/08/24 1308 06/08/24 1308   98.4 °F (36.9 °C) 69 16 123/58 98 %      Temp Source Heart Rate Source Patient Position  - Orthostatic VS BP Location FiO2 (%)   06/08/24 1307 -- -- -- --   Oral          Pain Score       --                  Vitals:    06/08/24 1308   BP: 123/58   Pulse: 69         Visual Acuity      ED Medications  Medications   amoxicillin-clavulanate (AUGMENTIN) 875-125 mg per tablet 1 tablet (1 tablet Oral Given 6/8/24 1327)       Diagnostic Studies  Results Reviewed       None                   No orders to display              Procedures  Procedures         ED Course                               SBIRT 20yo+      Flowsheet Row Most Recent Value   Initial Alcohol Screen: US AUDIT-C     1. How often do you have a drink containing alcohol? 0 Filed at: 06/08/2024 1328   2. How many drinks containing alcohol do you have on a typical day you are drinking?  0 Filed at: 06/08/2024 1328   3b. FEMALE Any Age, or MALE 65+: How often do you have 4 or more drinks on one occassion? 0 Filed at: 06/08/2024 1328   Audit-C Score 0 Filed at: 06/08/2024 1328   MARCELO: How many times in the past year have you...    Used an illegal drug or used a prescription medication for non-medical reasons? Never Filed at: 06/08/2024 1328                      Medical Decision Making  23-year-old female presenting to the emergency department today for right lower dental pain.  Recurrence for the past 2 months.  No facial swelling.  No systemic signs of infection.  Vital signs are stable.  On physical examination, the patient has 1 specific tooth with dental caries and gingival swelling suspicious for an underlying abscess.  The patient was dosed with Augmentin while here in the emergency department.  The patient is stable for discharge at this time.  Augmentin and Peridex sent to the patient's pharmacy.  The patient was given a list of dentists while here in the emergency department that she can follow-up outpatient with to attempt to get a sooner appointment than July (when her current dentist has her scheduled).  Strict return precautions were  "given.  Recommend PCP follow-up as soon as possible. The patient and/or patient's proxy verify their understanding and agree to the plan at this time.  All questions answered to the patient and/or their proxy's satisfaction.  All labs reviewed and utilized in the medical decision making process (if labs were ordered).  Portions of the record may have been created with voice recognition software.  Occasional wrong word or \"sound a like\" substitutions may have occurred due to the inherent limitations of voice recognition software.  Read the chart carefully and recognize, using context, where substitutions have occurred.    I reviewed prior notes.    Problems Addressed:  Dental infection: undiagnosed new problem with uncertain prognosis    Amount and/or Complexity of Data Reviewed  External Data Reviewed: notes.    Risk  Prescription drug management.             Disposition  Final diagnoses:   Dental infection     Time reflects when diagnosis was documented in both MDM as applicable and the Disposition within this note       Time User Action Codes Description Comment    6/8/2024  1:17 PM Esteban Cortes Add [K04.7] Dental infection           ED Disposition       ED Disposition   Discharge    Condition   Stable    Date/Time   Sat Jun 8, 2024 1317    Comment   Deandre Duke discharge to home/self care.                   Follow-up Information       Follow up With Specialties Details Why Contact Info Additional Information    [facility] Emergency Department Emergency Medicine Go to  If symptoms worsen [address] [address]  [phone] [facility] Emergency Department, [address] [address]    [facility] Schedule an appointment as soon as possible for a visit   [address] [place] [id number]  [phone] [facility], [address] [place] [address], " [id number]   [phone]            Discharge Medication List as of 6/8/2024  1:18 PM        START taking these medications    Details   amoxicillin-clavulanate (AUGMENTIN) 875-125 mg per tablet Take 1 tablet by mouth every 12 (twelve) hours for 7 days, Starting Sat 6/8/2024, Until Sat 6/15/2024, Normal      chlorhexidine (PERIDEX) 0.12 % solution Apply 15 mL to the mouth or throat 2 (two) times a day, Starting Sat 6/8/2024, Normal           CONTINUE these medications which have NOT CHANGED    Details   etonogestrel-ethinyl estradiol (NuvaRing) 0.12-0.015 MG/24HR vaginal ring Insert vaginally and leave in place for 3 consecutive weeks, then remove for 1 week., Normal      ondansetron (Zofran ODT) 4 mg disintegrating tablet Take 1 tablet (4 mg total) by mouth every 6 (six) hours as needed for nausea or vomiting, Starting Thu 6/29/2023, Normal             No discharge procedures on file.    PDMP Review       None            ED Provider  Electronically Signed by             Esteban Cortes PA-C  06/08/24 2032

## 2024-06-21 DIAGNOSIS — Z30.09 UNWANTED FERTILITY: ICD-10-CM

## 2024-06-21 RX ORDER — ETONOGESTREL AND ETHINYL ESTRADIOL VAGINAL RING .015; .12 MG/D; MG/D
RING VAGINAL
Qty: 1 EACH | Refills: 0 | Status: SHIPPED | OUTPATIENT
Start: 2024-06-21

## 2024-06-28 ENCOUNTER — TELEPHONE (OUTPATIENT)
Dept: OBGYN CLINIC | Facility: CLINIC | Age: 24
End: 2024-06-28

## 2024-07-11 ENCOUNTER — APPOINTMENT (EMERGENCY)
Dept: ULTRASOUND IMAGING | Facility: HOSPITAL | Age: 24
End: 2024-07-11
Payer: MEDICARE

## 2024-07-11 ENCOUNTER — HOSPITAL ENCOUNTER (EMERGENCY)
Facility: HOSPITAL | Age: 24
Discharge: HOME | End: 2024-07-11
Attending: EMERGENCY MEDICINE
Payer: MEDICARE

## 2024-07-11 ENCOUNTER — TELEPHONE (OUTPATIENT)
Dept: OBGYN CLINIC | Facility: CLINIC | Age: 24
End: 2024-07-11

## 2024-07-11 VITALS
HEART RATE: 72 BPM | OXYGEN SATURATION: 100 % | SYSTOLIC BLOOD PRESSURE: 124 MMHG | RESPIRATION RATE: 18 BRPM | DIASTOLIC BLOOD PRESSURE: 63 MMHG | TEMPERATURE: 99.2 F

## 2024-07-11 DIAGNOSIS — R82.71 BACTERIURIA IN PREGNANCY: ICD-10-CM

## 2024-07-11 DIAGNOSIS — O20.9 VAGINAL BLEEDING IN PREGNANCY, FIRST TRIMESTER: Primary | ICD-10-CM

## 2024-07-11 DIAGNOSIS — O99.891 BACTERIURIA IN PREGNANCY: ICD-10-CM

## 2024-07-11 LAB
ABO GROUP BLD: NORMAL
ALBUMIN SERPL BCG-MCNC: 4.4 G/DL (ref 3.5–5)
ALP SERPL-CCNC: 40 U/L (ref 34–104)
ALT SERPL W P-5'-P-CCNC: 10 U/L (ref 7–52)
ANION GAP SERPL CALCULATED.3IONS-SCNC: 8 MMOL/L (ref 4–13)
AST SERPL W P-5'-P-CCNC: 14 U/L (ref 13–39)
B-HCG SERPL-ACNC: ABNORMAL MIU/ML (ref 0–5)
BACTERIA UR QL AUTO: ABNORMAL /HPF
BASOPHILS # BLD AUTO: 0.05 THOUSANDS/ÂΜL (ref 0–0.1)
BASOPHILS NFR BLD AUTO: 1 % (ref 0–1)
BILIRUB SERPL-MCNC: 0.66 MG/DL (ref 0.2–1)
BILIRUB UR QL STRIP: NEGATIVE
BLD GP AB SCN SERPL QL: NEGATIVE
BUN SERPL-MCNC: 10 MG/DL (ref 5–25)
CALCIUM SERPL-MCNC: 9.4 MG/DL (ref 8.4–10.2)
CHLORIDE SERPL-SCNC: 106 MMOL/L (ref 96–108)
CLARITY UR: CLEAR
CO2 SERPL-SCNC: 23 MMOL/L (ref 21–32)
COLOR UR: YELLOW
CREAT SERPL-MCNC: 0.69 MG/DL (ref 0.6–1.3)
EOSINOPHIL # BLD AUTO: 0.11 THOUSAND/ÂΜL (ref 0–0.61)
EOSINOPHIL NFR BLD AUTO: 1 % (ref 0–6)
ERYTHROCYTE [DISTWIDTH] IN BLOOD BY AUTOMATED COUNT: 13.3 % (ref 11.6–15.1)
EXT PREGNANCY TEST URINE: POSITIVE
EXT. CONTROL: ABNORMAL
GFR SERPL CREATININE-BSD FRML MDRD: 123 ML/MIN/1.73SQ M
GLUCOSE SERPL-MCNC: 75 MG/DL (ref 65–140)
GLUCOSE UR STRIP-MCNC: NEGATIVE MG/DL
HCT VFR BLD AUTO: 35.8 % (ref 34.8–46.1)
HGB BLD-MCNC: 11.6 G/DL (ref 11.5–15.4)
HGB UR QL STRIP.AUTO: ABNORMAL
IMM GRANULOCYTES # BLD AUTO: 0.01 THOUSAND/UL (ref 0–0.2)
IMM GRANULOCYTES NFR BLD AUTO: 0 % (ref 0–2)
KETONES UR STRIP-MCNC: NEGATIVE MG/DL
LEUKOCYTE ESTERASE UR QL STRIP: ABNORMAL
LYMPHOCYTES # BLD AUTO: 3.76 THOUSANDS/ÂΜL (ref 0.6–4.47)
LYMPHOCYTES NFR BLD AUTO: 43 % (ref 14–44)
MCH RBC QN AUTO: 28.5 PG (ref 26.8–34.3)
MCHC RBC AUTO-ENTMCNC: 32.4 G/DL (ref 31.4–37.4)
MCV RBC AUTO: 88 FL (ref 82–98)
MONOCYTES # BLD AUTO: 0.67 THOUSAND/ÂΜL (ref 0.17–1.22)
MONOCYTES NFR BLD AUTO: 8 % (ref 4–12)
MUCOUS THREADS UR QL AUTO: ABNORMAL
NEUTROPHILS # BLD AUTO: 4.07 THOUSANDS/ÂΜL (ref 1.85–7.62)
NEUTS SEG NFR BLD AUTO: 47 % (ref 43–75)
NITRITE UR QL STRIP: NEGATIVE
NON-SQ EPI CELLS URNS QL MICRO: ABNORMAL /HPF
NRBC BLD AUTO-RTO: 0 /100 WBCS
PH UR STRIP.AUTO: 6 [PH]
PLATELET # BLD AUTO: 204 THOUSANDS/UL (ref 149–390)
PMV BLD AUTO: 10.6 FL (ref 8.9–12.7)
POTASSIUM SERPL-SCNC: 3.6 MMOL/L (ref 3.5–5.3)
PROT SERPL-MCNC: 7.1 G/DL (ref 6.4–8.4)
PROT UR STRIP-MCNC: NEGATIVE MG/DL
RBC # BLD AUTO: 4.07 MILLION/UL (ref 3.81–5.12)
RBC #/AREA URNS AUTO: ABNORMAL /HPF
RH BLD: POSITIVE
SODIUM SERPL-SCNC: 137 MMOL/L (ref 135–147)
SP GR UR STRIP.AUTO: >=1.03 (ref 1–1.03)
SPECIMEN EXPIRATION DATE: NORMAL
UROBILINOGEN UR QL STRIP.AUTO: 0.2 E.U./DL
WBC # BLD AUTO: 8.67 THOUSAND/UL (ref 4.31–10.16)
WBC #/AREA URNS AUTO: ABNORMAL /HPF

## 2024-07-11 PROCEDURE — 81025 URINE PREGNANCY TEST: CPT | Performed by: PHYSICIAN ASSISTANT

## 2024-07-11 PROCEDURE — 86900 BLOOD TYPING SEROLOGIC ABO: CPT | Performed by: PHYSICIAN ASSISTANT

## 2024-07-11 PROCEDURE — 36415 COLL VENOUS BLD VENIPUNCTURE: CPT | Performed by: PHYSICIAN ASSISTANT

## 2024-07-11 PROCEDURE — 81003 URINALYSIS AUTO W/O SCOPE: CPT | Performed by: PHYSICIAN ASSISTANT

## 2024-07-11 PROCEDURE — 86901 BLOOD TYPING SEROLOGIC RH(D): CPT | Performed by: PHYSICIAN ASSISTANT

## 2024-07-11 PROCEDURE — 76815 OB US LIMITED FETUS(S): CPT

## 2024-07-11 PROCEDURE — 85025 COMPLETE CBC W/AUTO DIFF WBC: CPT | Performed by: PHYSICIAN ASSISTANT

## 2024-07-11 PROCEDURE — 86850 RBC ANTIBODY SCREEN: CPT | Performed by: PHYSICIAN ASSISTANT

## 2024-07-11 PROCEDURE — 81001 URINALYSIS AUTO W/SCOPE: CPT | Performed by: PHYSICIAN ASSISTANT

## 2024-07-11 PROCEDURE — 84702 CHORIONIC GONADOTROPIN TEST: CPT | Performed by: PHYSICIAN ASSISTANT

## 2024-07-11 PROCEDURE — 96360 HYDRATION IV INFUSION INIT: CPT

## 2024-07-11 PROCEDURE — 99284 EMERGENCY DEPT VISIT MOD MDM: CPT | Performed by: PHYSICIAN ASSISTANT

## 2024-07-11 PROCEDURE — 80053 COMPREHEN METABOLIC PANEL: CPT | Performed by: PHYSICIAN ASSISTANT

## 2024-07-11 PROCEDURE — 99284 EMERGENCY DEPT VISIT MOD MDM: CPT

## 2024-07-11 RX ORDER — CEPHALEXIN 500 MG/1
500 CAPSULE ORAL EVERY 8 HOURS SCHEDULED
Qty: 12 CAPSULE | Refills: 0 | Status: SHIPPED | OUTPATIENT
Start: 2024-07-11 | End: 2024-07-15

## 2024-07-11 RX ADMIN — SODIUM CHLORIDE 1000 ML: 0.9 INJECTION, SOLUTION INTRAVENOUS at 09:55

## 2024-07-11 NOTE — DISCHARGE INSTRUCTIONS
Please return to the emergency department for worsening symptoms including chest pain, shortness of breath, dizziness, lightheadedness, fever greater than 103, severe pain, inability to walk, fainting episodes, etc..  Please follow-up with your family practice provider as soon as possible.  I have sent medications over to the pharmacy for your symptoms.  Please take as directed.  Follow-up outpatient with an OB/GYN.  I have given you a referral.  Call to make an appointment.  I have given you a prescription for a blood draw.  Please repeat this in 2 days.  This will give us a better idea of if your pregnancy is advancing based upon blood work findings when compared to blood work findings today.

## 2024-07-11 NOTE — ED PROVIDER NOTES
History  Chief Complaint   Patient presents with    Pregnancy Problem     Positive pregnancy test at home on 7/6/24.  Pt has been cramping and bleeding x1.5 weeks.  Pt arrives with photos of such.  Last known menstrual period on 5/3     This is a 23-year-old female presenting to the emergency department today for vaginal bleeding and pelvic cramping for approximately 1-1/2 weeks.  The patient notes she had a positive home pregnancy test on July 6, 2024.  Her last menstrual period was May 3, 2024.  She notes vaginal bleeding with some clot formation.  This is associated with pelvic cramping.  She denies any dysuria, hematuria, or foul-smelling urine.  She denies any vaginal discharge.  She has no flank pain.  No history of spontaneous miscarriages per the patient.  She has not yet followed up with OB/GYN.  No chest pain or shortness of breath.  No dizziness, lightheadedness, or visual disturbances.  The patient denies other complaints at this time.      History provided by:  Patient   used: No    Pregnancy Problem  Quality:  Bright red  Severity:  Moderate  Onset quality:  Gradual  Duration: 1.5 weeks.  Timing:  Intermittent  Progression:  Waxing and waning  Chronicity:  New  Relieved by:  Nothing  Worsened by:  Nothing  Ineffective treatments:  None tried  Associated symptoms: no abdominal pain, no back pain, no dizziness, no dyspareunia, no dysuria, no fatigue, no fever, no nausea and no vaginal discharge        Prior to Admission Medications   Prescriptions Last Dose Informant Patient Reported? Taking?   chlorhexidine (PERIDEX) 0.12 % solution   No No   Sig: Apply 15 mL to the mouth or throat 2 (two) times a day   etonogestrel-ethinyl estradiol (NuvaRing) 0.12-0.015 MG/24HR vaginal ring   No No   Sig: INSERT 1 RING VAGINALLY AS DIRECTED. REMOVE AFTER 3 WEEKS & WAIT 7 DAYS BEFORE INSERTING A NEW RING   ondansetron (Zofran ODT) 4 mg disintegrating tablet   No No   Sig: Take 1 tablet (4 mg total)  by mouth every 6 (six) hours as needed for nausea or vomiting   Patient not taking: Reported on 10/20/2023      Facility-Administered Medications: None       Past Medical History:   Diagnosis Date    Chlamydia     2021, 2023    Gonorrhea     2023       Past Surgical History:   Procedure Laterality Date    NO PAST SURGERIES         Family History   Problem Relation Age of Onset    No Known Problems Mother     No Known Problems Father     No Known Problems Sister     No Known Problems Brother     No Known Problems Sister     No Known Problems Brother     Breast cancer Neg Hx     Colon cancer Neg Hx     Ovarian cancer Neg Hx     Cancer Neg Hx      I have reviewed and agree with the history as documented.    E-Cigarette/Vaping    E-Cigarette Use Current Every Day User      E-Cigarette/Vaping Substances    Nicotine Yes      Social History     Tobacco Use    Smoking status: Every Day     Types: E-Cigarettes    Smokeless tobacco: Never   Vaping Use    Vaping status: Every Day    Substances: Nicotine   Substance Use Topics    Alcohol use: Yes     Comment: couple times/month    Drug use: Yes     Types: Marijuana     Comment: occ       Review of Systems   Constitutional:  Negative for appetite change, chills, diaphoresis, fatigue and fever.   Eyes:  Negative for visual disturbance.   Respiratory:  Negative for cough, chest tightness, shortness of breath and wheezing.    Cardiovascular:  Negative for chest pain, palpitations and leg swelling.   Gastrointestinal:  Negative for abdominal pain and nausea.   Genitourinary:  Positive for pelvic pain and vaginal bleeding. Negative for dyspareunia, dysuria, vaginal discharge and vaginal pain.   Musculoskeletal:  Negative for back pain, neck pain and neck stiffness.   Skin:  Negative for rash and wound.   Neurological:  Negative for dizziness, seizures, syncope, weakness, light-headedness, numbness and headaches.   Psychiatric/Behavioral:  Negative for confusion.    All other systems  reviewed and are negative.      Physical Exam  Physical Exam  Vitals and nursing note reviewed.   Constitutional:       General: She is not in acute distress.     Appearance: Normal appearance. She is normal weight. She is not ill-appearing, toxic-appearing or diaphoretic.   HENT:      Head: Normocephalic and atraumatic.      Nose: Nose normal. No congestion or rhinorrhea.      Mouth/Throat:      Mouth: Mucous membranes are moist.      Pharynx: No oropharyngeal exudate or posterior oropharyngeal erythema.   Eyes:      General: No scleral icterus.        Right eye: No discharge.         Left eye: No discharge.      Extraocular Movements: Extraocular movements intact.      Pupils: Pupils are equal, round, and reactive to light.   Cardiovascular:      Rate and Rhythm: Normal rate and regular rhythm.      Pulses: Normal pulses.      Heart sounds: Normal heart sounds. No murmur heard.     No friction rub. No gallop.   Pulmonary:      Effort: Pulmonary effort is normal. No respiratory distress.      Breath sounds: Normal breath sounds. No stridor. No wheezing, rhonchi or rales.   Chest:      Chest wall: No tenderness.   Abdominal:      General: Abdomen is flat. There is no distension.      Palpations: Abdomen is soft.      Tenderness: There is no abdominal tenderness. There is no right CVA tenderness, left CVA tenderness, guarding or rebound.      Comments: Soft, nontender, nondistended, and without organomegaly.  No rebound, Rovsing, or McBurney's point tenderness.  Nonperitoneal.  Negative Yoo sign.   Musculoskeletal:         General: Normal range of motion.      Cervical back: Normal range of motion. No tenderness.      Right lower leg: No edema.      Left lower leg: No edema.   Skin:     General: Skin is warm and dry.      Capillary Refill: Capillary refill takes less than 2 seconds.      Coloration: Skin is not jaundiced or pale.   Neurological:      General: No focal deficit present.      Mental Status: She is  alert and oriented to person, place, and time. Mental status is at baseline.   Psychiatric:         Mood and Affect: Mood normal.         Behavior: Behavior normal.         Vital Signs  ED Triage Vitals [07/11/24 0847]   Temperature Pulse Respirations Blood Pressure SpO2   99.2 °F (37.3 °C) 72 18 124/63 100 %      Temp Source Heart Rate Source Patient Position - Orthostatic VS BP Location FiO2 (%)   Oral Monitor Sitting Right arm --      Pain Score       --           Vitals:    07/11/24 0847   BP: 124/63   Pulse: 72   Patient Position - Orthostatic VS: Sitting         Visual Acuity      ED Medications  Medications   sodium chloride 0.9 % bolus 1,000 mL (0 mL Intravenous Stopped 7/11/24 1055)       Diagnostic Studies  Results Reviewed       Procedure Component Value Units Date/Time    hCG, quantitative [[id number]]  (Abnormal) Collected: 07/11/24 0944    Lab Status: Final result Specimen: Blood from Arm, Left Updated: 07/11/24 1057     HCG, Quant 14,992.1 mIU/mL     Narrative:       Expected Ranges:    HCG results between 5.0 and 25.0 mIU/mL may be indicative of early pregnancy but should be interpreted in light of the total clinical presentation.    HCG can rise to detectable levels in darcy and post menopausal women (0-11.6 mIU/mL).     Approximate               Approximate HCG  Gestation age          Concentration ( mIU/mL)  _____________          ______________________   Weeks                      HCG values  0.2-1                       5-50  1-2                           2-3                         100-5000  3-4                         500-70978  4-5                         1000-52728  5-6                         72866-218111  6-8                         51618-492841  8-12                        44816-690340      Urine Microscopic [[id number]]  (Abnormal) Collected: 07/11/24 0944    Lab Status: Final result Specimen: Urine, Clean Catch Updated: 07/11/24 1013     RBC, UA 0-5 /hpf      WBC, UA 10-20 /hpf       Epithelial Cells Moderate /hpf      Bacteria, UA Occasional /hpf      MUCUS THREADS Moderate    Comprehensive metabolic panel [[id number]] Collected: 07/11/24 0944    Lab Status: Final result Specimen: Blood from Arm, Left Updated: 07/11/24 1009     Sodium 137 mmol/L      Potassium 3.6 mmol/L      Chloride 106 mmol/L      CO2 23 mmol/L      ANION GAP 8 mmol/L      BUN 10 mg/dL      Creatinine 0.69 mg/dL      Glucose 75 mg/dL      Calcium 9.4 mg/dL      AST 14 U/L      ALT 10 U/L      Alkaline Phosphatase 40 U/L      Total Protein 7.1 g/dL      Albumin 4.4 g/dL      Total Bilirubin 0.66 mg/dL      eGFR 123 ml/min/1.73sq m     Narrative:      National Kid[employer] Disease Foundation guidelines for Chronic Kidney Disease (CKD):     Stage 1 with normal or high GFR (GFR > 90 mL/min/1.73 square meters)    Stage 2 Mild CKD (GFR = 60-89 mL/min/1.73 square meters)    Stage 3A Moderate CKD (GFR = 45-59 mL/min/1.73 square meters)    Stage 3B Moderate CKD (GFR = 30-44 mL/min/1.73 square meters)    Stage 4 Severe CKD (GFR = 15-29 mL/min/1.73 square meters)    Stage 5 End Stage CKD (GFR <15 mL/min/1.73 square meters)  Note: GFR calculation is accurate only with a steady state creatinine    POCT pregnancy, urine [[id number]]  (Abnormal) Resulted: 07/11/24 0954    Lab Status: Final result Updated: 07/11/24 0955     EXT Preg Test, Ur Positive     Control Valid    UA w Reflex to Microscopic w Reflex to Culture [[id number]]  (Abnormal) Collected: 07/11/24 0944    Lab Status: Final result Specimen: Urine, Clean Catch Updated: 07/11/24 0954     Color, UA Yellow     Clarity, UA Clear     Specific Gravity, UA >=1.030     pH, UA 6.0     Leukocytes, UA 2+     Nitrite, UA Negative     Protein, UA Negative mg/dl      Glucose, UA Negative mg/dl      Ketones, UA Negative mg/dl      Urobilinogen, UA 0.2 E.U./dl      Bilirubin, UA Negative     Occult Blood, UA 3+    CBC and differential [[id number]] Collected: 07/11/24 0944    Lab Status: Final result  Specimen: Blood from Arm, Left Updated: 07/11/24 0952     WBC 8.67 Thousand/uL      RBC 4.07 Million/uL      Hemoglobin 11.6 g/dL      Hematocrit 35.8 %      MCV 88 fL      MCH 28.5 pg      MCHC 32.4 g/dL      RDW 13.3 %      MPV 10.6 fL      Platelets 204 Thousands/uL      nRBC 0 /100 WBCs      Segmented % 47 %      Immature Grans % 0 %      Lymphocytes % 43 %      Monocytes % 8 %      Eosinophils Relative 1 %      Basophils Relative 1 %      Absolute Neutrophils 4.07 Thousands/µL      Absolute Immature Grans 0.01 Thousand/uL      Absolute Lymphocytes 3.76 Thousands/µL      Absolute Monocytes 0.67 Thousand/µL      Eosinophils Absolute 0.11 Thousand/µL      Basophils Absolute 0.05 Thousands/µL                    US OB pregnancy limited with transvaginal   Final Result by Domingo Giraldo MD (07/11 1131)      Single early intrauterine gestational sac with yolk sac and no fetal pole. Recommend follow-up ultrasound to assess for viability.            Workstation performed: [id number]                    Procedures  Procedures         ED Course  ED Course as of 07/11/24 1847   Thu Jul 11, 2024   1107 Rh Factor: Positive                                 SBIRT 20yo+      Flowsheet Row Most Recent Value   Initial Alcohol Screen: US AUDIT-C     1. How often do you have a drink containing alcohol? 0 Filed at: 07/11/2024 0850   2. How many drinks containing alcohol do you have on a typical day you are drinking?  0 Filed at: 07/11/2024 0850   3a. Male UNDER 65: How often do you have five or more drinks on one occasion? 0 Filed at: 07/11/2024 0850   3b. FEMALE Any Age, or MALE 65+: How often do you have 4 or more drinks on one occassion? 0 Filed at: 07/11/2024 0850   Audit-C Score 0 Filed at: 07/11/2024 0850   MARCELO: How many times in the past year have you...    Used an illegal drug or used a prescription medication for non-medical reasons? Never Filed at: 07/11/2024 0850                      Medical Decision  "Making  23-year-old female presenting to the emergency department today for vaginal bleeding in the first trimester of pregnancy.  This is associated with suprapubic cramping.  Vital signs are stable.  Afebrile.  On physical examination, the patient has a benign abdominal examination.  The patient had a positive urine pregnancy test.  She has bacteriuria on urine microscopy.  No leukocytosis.  Hemoglobin is stable.  [place] [address].  The patient's blood type is a positive.  Ultrasound of the pelvis shows single early intra gestational sac without identifiable fetal pole.  The patient is stable for discharge at this time.  The patient will need further follow-up to ensure viability of this pregnancy.  I gave the patient a prescription for outpatient beta-hCG testing.  I recommend she follow-up outpatient with OB/GYN for further imaging and testing.  Referral placed for OB/GYN.  Return to the emergency department for worsening symptoms.  Strict return precautions were given.  Keflex sent to the patient's pharmacy for asymptomatic bacteriuria in pregnancy.  Recommend PCP follow-up as soon as possible. The patient and/or patient's proxy verify their understanding and agree to the plan at this time.  All questions answered to the patient and/or their proxy's satisfaction.  All labs reviewed and utilized in the medical decision making process (if labs were ordered).  Portions of the record may have been created with voice recognition software.  Occasional wrong word or \"sound a like\" substitutions may have occurred due to the inherent limitations of voice recognition software.  Read the chart carefully and recognize, using context, where substitutions have occurred.    I reviewed prior notes.    Problems Addressed:  Bacteriuria in pregnancy: undiagnosed new problem with uncertain prognosis  Vaginal bleeding in pregnancy, first trimester: undiagnosed new problem with uncertain prognosis    Amount and/or Complexity of " Data Reviewed  External Data Reviewed: notes.  Labs: ordered. Decision-making details documented in ED Course.  Radiology: ordered. Decision-making details documented in ED Course.    Risk  Prescription drug management.                 Disposition  Final diagnoses:   Vaginal bleeding in pregnancy, first trimester   Bacteriuria in pregnancy     Time reflects when diagnosis was documented in both MDM as applicable and the Disposition within this note       Time User Action Codes Description Comment    7/11/2024 11:52 AM Esteban Cortes [O20.9] Vaginal bleeding in pregnancy, first trimester     7/11/2024 11:53 AM Esteban Cortes Add [O99.891,  R82.71] Bacteriuria in pregnancy           ED Disposition       ED Disposition   Discharge    Condition   Stable    Date/Time   Thu Jul 11, 2024 1155    Comment   Deandre Duke discharge to home/self care.                   Follow-up Information       Follow up With Specialties Details Why Contact Info Additional Information    [facility] Emergency Department Emergency Medicine Go to  If symptoms worsen [address] [id number] [phone] [facility] Emergency Department, [address] [address]    [facility] Schedule an appointment as soon as possible for a visit   [address] [place] [phone] [facility], [address] [place] [address], [address] [phone]    Gail Arroyo MD Obstetrics and Gynecology, Obstetrics, Gynecology Call   [address] [address] [phone]       [facility] Obstetrics and Gynecology Call   [address] [facility] [address] [phone] [facility], [address] [place], [place], [address] [phone]            Discharge Medication List as  of 7/11/2024 11:55 AM        START taking these medications    Details   cephalexin (KEFLEX) 500 mg capsule Take 1 capsule (500 mg total) by mouth every 8 (eight) hours for 4 days, Starting Thu 7/11/2024, Until Mon 7/15/2024, Normal           CONTINUE these medications which have NOT CHANGED    Details   chlorhexidine (PERIDEX) 0.12 % solution Apply 15 mL to the mouth or throat 2 (two) times a day, Starting Sat 6/8/2024, Normal      etonogestrel-ethinyl estradiol (NuvaRing) 0.12-0.015 MG/24HR vaginal ring INSERT 1 RING VAGINALLY AS DIRECTED. REMOVE AFTER 3 WEEKS & WAIT 7 DAYS BEFORE INSERTING A NEW RING, Normal      ondansetron (Zofran ODT) 4 mg disintegrating tablet Take 1 tablet (4 mg total) by mouth every 6 (six) hours as needed for nausea or vomiting, Starting Thu 6/29/2023, Normal             Outpatient Discharge Orders   hCG, quantitative   Standing Status: Future Standing Exp. Date: 07/13/24       PDMP Review       None            ED Provider  Electronically Signed by             Esteban Cortes PA-C  07/11/24 4187

## 2024-07-18 ENCOUNTER — OFFICE VISIT (OUTPATIENT)
Dept: OBGYN CLINIC | Facility: CLINIC | Age: 24
End: 2024-07-18
Payer: MEDICARE

## 2024-07-18 ENCOUNTER — APPOINTMENT (OUTPATIENT)
Dept: LAB | Facility: HOSPITAL | Age: 24
End: 2024-07-18
Payer: MEDICARE

## 2024-07-18 VITALS
WEIGHT: 134.8 LBS | BODY MASS INDEX: 24.8 KG/M2 | DIASTOLIC BLOOD PRESSURE: 60 MMHG | HEIGHT: 62 IN | SYSTOLIC BLOOD PRESSURE: 112 MMHG

## 2024-07-18 DIAGNOSIS — O20.0 THREATENED ABORTION: ICD-10-CM

## 2024-07-18 DIAGNOSIS — Z11.3 SCREENING FOR STDS (SEXUALLY TRANSMITTED DISEASES): ICD-10-CM

## 2024-07-18 DIAGNOSIS — O03.9 SPONTANEOUS MISCARRIAGE: ICD-10-CM

## 2024-07-18 DIAGNOSIS — O20.0 THREATENED ABORTION: Primary | ICD-10-CM

## 2024-07-18 LAB — B-HCG SERPL-ACNC: ABNORMAL MIU/ML (ref 0–5)

## 2024-07-18 PROCEDURE — 87661 TRICHOMONAS VAGINALIS AMPLIF: CPT | Performed by: OBSTETRICS & GYNECOLOGY

## 2024-07-18 PROCEDURE — 84702 CHORIONIC GONADOTROPIN TEST: CPT

## 2024-07-18 PROCEDURE — 99203 OFFICE O/P NEW LOW 30 MIN: CPT | Performed by: OBSTETRICS & GYNECOLOGY

## 2024-07-18 PROCEDURE — 87591 N.GONORRHOEAE DNA AMP PROB: CPT | Performed by: OBSTETRICS & GYNECOLOGY

## 2024-07-18 PROCEDURE — 87491 CHLMYD TRACH DNA AMP PROBE: CPT | Performed by: OBSTETRICS & GYNECOLOGY

## 2024-07-18 PROCEDURE — 87563 M. GENITALIUM AMP PROBE: CPT | Performed by: OBSTETRICS & GYNECOLOGY

## 2024-07-18 PROCEDURE — 36415 COLL VENOUS BLD VENIPUNCTURE: CPT

## 2024-07-18 RX ORDER — MISOPROSTOL 200 UG/1
TABLET ORAL
Qty: 4 TABLET | Refills: 0 | Status: SHIPPED | OUTPATIENT
Start: 2024-07-18

## 2024-07-18 RX ORDER — CEPHALEXIN 500 MG/1
500 CAPSULE ORAL EVERY 8 HOURS SCHEDULED
COMMUNITY

## 2024-07-18 NOTE — PROGRESS NOTES
Assessment/Plan:     There are no diagnoses linked to this encounter.      23-year-old female  Pregnant with vaginal bleeding  Prior vaginal delivery  History of 3 vtop one medical and 2 surgical   Needs NuvaRing for contraception  This pregnancy is planned  Plan  GC/CT/trichomoniasis  To go for repeated hCG.  hCG level is decreasing confirm finding of spontaneous miscarriage  Patient desires medical management we will call Cytotec to the pharmacy to take 4 pills vaginally  Return to office in 2 weeks follow-up and discuss contraception      Subjective:      Patient ID: Deandre Duke is a 23 y.o. female.    HPI  22 yo female presents to the office today secondary to vaginal bleeding  Follow-up from the emergency room she went to the ER secondary to bleeding and cramping  Patient was passing clots and tissue and now bleeding like it.  Bleeding was happening since last week  Patient close she has finding of early pregnancy  This pregnancy is not desired       LMP 5/3/2024 nuva ring for contraception   Had 3 VTOP 1 medical 2 surgical , 1     PMH none  PSH 2 d&c  MEDCS       Component  Ref Range & Units 24  3:35 PM 24  9:44 AM   HCG, Quant  0 - 5 mIU/mL 11,859.7 High  14,992.1 High        At the visit patient instructed to to go to the lab and have repeated hCG if level is decreasing with the patient's bleeding will confirm spontaneous miscarriage in process   Option given for expectant management, medical management or surgical management patient desires medical management if hCG level confirmed finding of miscarriage in process side effect of the medication reviewed and discussed with patient all patient questions answered and patient was satisfied          the following portions of the patient's history were reviewed and updated as appropriate: allergies, current medications, past family history, past medical history, past social history, past surgical history and problem list.    Review of  "Systems      Objective:      /60 (BP Location: Left arm, Patient Position: Sitting, Cuff Size: Adult)   Ht 5' 2\" (1.575 m)   Wt 61.1 kg (134 lb 12.8 oz)   LMP 05/03/2024 (Exact Date)   BMI 24.66 kg/m²          Physical Exam  Constitutional:       Appearance: She is well-developed.   Abdominal:      General: There is no distension.      Palpations: Abdomen is soft.      Tenderness: There is no abdominal tenderness.   Genitourinary:     Labia:         Right: No rash, tenderness or lesion.         Left: No rash, tenderness or lesion.       Vagina: No signs of injury. No vaginal discharge, erythema or tenderness.      Cervix: No cervical motion tenderness, discharge or friability.      Adnexa:         Right: No mass, tenderness or fullness.          Left: No mass, tenderness or fullness.        Comments: Speculum applied cervix noted to be closed with blood noted at the level of the os  Bedside ultrasound performed small gestational sac 5 weeks no fetal pole noted same finding as emergency room visit last week finding review I discussed with patient  Neurological:      Mental Status: She is alert and oriented to person, place, and time.   Psychiatric:         Behavior: Behavior normal.         "

## 2024-07-19 ENCOUNTER — TELEPHONE (OUTPATIENT)
Dept: OBGYN CLINIC | Facility: CLINIC | Age: 24
End: 2024-07-19

## 2024-07-19 DIAGNOSIS — A74.9 CHLAMYDIA: Primary | ICD-10-CM

## 2024-07-19 LAB
C TRACH DNA SPEC QL NAA+PROBE: POSITIVE
N GONORRHOEA DNA SPEC QL NAA+PROBE: NEGATIVE

## 2024-07-19 RX ORDER — DOXYCYCLINE 100 MG/1
100 TABLET ORAL 2 TIMES DAILY
Qty: 14 TABLET | Refills: 0 | Status: SHIPPED | OUTPATIENT
Start: 2024-07-19 | End: 2024-07-26

## 2024-07-19 NOTE — TELEPHONE ENCOUNTER
----- Message from Gail Arroyo MD sent at 7/18/2024  8:32 PM EDT -----  Please call the patient let her know I will call in medication to her pharmacy to take Motrin 600 mg at night to take medication vaginally 1 hour after taking Motrin level of the hCG is decreasing patient requested medical management patient may experience heavy bleeding within 2 to 4 hours after vaginal insertion

## 2024-07-19 NOTE — TELEPHONE ENCOUNTER
Called pt and advised of instructions on how to administer medication per provider's message to staff.

## 2024-07-22 LAB
M GENITALIUM DNA SPEC QL NAA+PROBE: NEGATIVE
T VAGINALIS DNA SPEC QL NAA+PROBE: NEGATIVE

## 2024-07-30 DIAGNOSIS — Z30.09 UNWANTED FERTILITY: ICD-10-CM

## 2024-07-30 RX ORDER — ETONOGESTREL AND ETHINYL ESTRADIOL VAGINAL RING .015; .12 MG/D; MG/D
RING VAGINAL
Qty: 1 EACH | Refills: 0 | Status: SHIPPED | OUTPATIENT
Start: 2024-07-30

## 2024-07-30 RX ORDER — ETONOGESTREL AND ETHINYL ESTRADIOL VAGINAL RING .015; .12 MG/D; MG/D
RING VAGINAL
Qty: 1 EACH | Refills: 0 | Status: SHIPPED | OUTPATIENT
Start: 2024-07-30 | End: 2024-07-30 | Stop reason: SDUPTHER

## 2024-08-15 ENCOUNTER — ANNUAL EXAM (OUTPATIENT)
Dept: OBGYN CLINIC | Facility: CLINIC | Age: 24
End: 2024-08-15
Payer: MEDICARE

## 2024-08-15 VITALS
BODY MASS INDEX: 25.32 KG/M2 | WEIGHT: 137.6 LBS | SYSTOLIC BLOOD PRESSURE: 110 MMHG | DIASTOLIC BLOOD PRESSURE: 60 MMHG | HEIGHT: 62 IN

## 2024-08-15 DIAGNOSIS — O03.9 SAB (SPONTANEOUS ABORTION): ICD-10-CM

## 2024-08-15 DIAGNOSIS — Z01.411 ENCOUNTER FOR GYNECOLOGICAL EXAMINATION WITH ABNORMAL FINDING: Primary | ICD-10-CM

## 2024-08-15 PROCEDURE — G0145 SCR C/V CYTO,THINLAYER,RESCR: HCPCS | Performed by: PHYSICIAN ASSISTANT

## 2024-08-15 PROCEDURE — 99395 PREV VISIT EST AGE 18-39: CPT | Performed by: PHYSICIAN ASSISTANT

## 2024-08-15 NOTE — PATIENT INSTRUCTIONS
Go for blood work.    Place Nuva ring first day of next period.  Call when refills needed.    Follow up for test of cure as planned.

## 2024-08-15 NOTE — PROGRESS NOTES
"Assessment/Plan:      Diagnoses and all orders for this visit:    Encounter for gynecological examination with abnormal finding  -     Ambulatory Referral to Obstetrics / Gynecology  -     Liquid-based pap, screening    SAB (spontaneous )  -     hCG, quantitative; Future        Pap done.  Too early for LETICIA; has appointment scheduled for October.  Order for beta HCG entered; we will call with results.  Can place Nuva ring first day of next period.  Call when refills needed.  F/u in October for test of cure; call with problems in the interim.    Subjective:     Patient ID: Deandre Duke is a 23 y.o. female.    Patient is here for yearly gyn exam.  States she is doing well overall.  S/p SAB in July.  Was prescribed Cytotec on  to help finish passage of POC.  Stopped bleeding around .  Has not had a period yet.  Beta HCG on  was 11,859; has not had repeat level.  She would like to go back on Nuva ring for contraception; does not need refills at this time.  GC/chlamydia screening on  was positive for chlamydia; finished abx.  Patient denies bowel/bladder changes, pelvic pain, bloating, abdominal pain, n/v, change in appetite, and thyroid disease.    Patient is performing self-breast exam.  Denies new masses, skin changes, nipple discharge, and pain/tenderness.      Review of Systems   Constitutional:  Negative for appetite change and unexpected weight change.   Cardiovascular:         No masses, skin changes, nipple discharge, and pain/tenderness.   Gastrointestinal:  Negative for abdominal distention, abdominal pain, constipation, diarrhea, nausea and vomiting.   Genitourinary:  Negative for difficulty urinating, dysuria, frequency, genital sores, hematuria, menstrual problem, pelvic pain, urgency, vaginal bleeding, vaginal discharge and vaginal pain.         Objective:  Visit Vitals  /60 (BP Location: Left arm, Patient Position: Sitting, Cuff Size: Adult)   Ht 5' 2\" (1.575 m)   Wt " 62.4 kg (137 lb 9.6 oz)   LMP 05/03/2024 (Exact Date)   Breastfeeding Unknown   BMI 25.17 kg/m²   OB Status Having periods   Smoking Status Every Day   BSA 1.63 m²         Physical Exam  Vitals reviewed. Exam conducted with a chaperone present.   Constitutional:       Appearance: Normal appearance. She is well-developed.   Neck:      Thyroid: No thyromegaly.   Pulmonary:      Effort: Pulmonary effort is normal.   Chest:   Breasts:     Breasts are symmetrical.      Right: Normal. No swelling, bleeding, inverted nipple, mass, nipple discharge, skin change or tenderness.      Left: Normal. No swelling, bleeding, inverted nipple, mass, nipple discharge, skin change or tenderness.   Abdominal:      General: There is no distension.      Palpations: Abdomen is soft.      Tenderness: There is no abdominal tenderness.   Genitourinary:     General: Normal vulva.      Pubic Area: No rash.       Labia:         Right: No rash, tenderness, lesion or injury.         Left: No rash, tenderness, lesion or injury.       Vagina: Normal. No vaginal discharge, erythema, tenderness or bleeding.      Cervix: Normal.      Uterus: Normal.       Adnexa: Right adnexa normal and left adnexa normal.        Right: No mass, tenderness or fullness.          Left: No mass, tenderness or fullness.     Musculoskeletal:      Cervical back: Neck supple.   Lymphadenopathy:      Cervical: No cervical adenopathy.      Upper Body:      Right upper body: No supraclavicular or axillary adenopathy.      Left upper body: No supraclavicular or axillary adenopathy.      Lower Body: No right inguinal adenopathy. No left inguinal adenopathy.   Skin:     General: Skin is warm and dry.   Neurological:      Mental Status: She is alert and oriented to person, place, and time.   Psychiatric:         Behavior: Behavior normal. Behavior is cooperative.         Thought Content: Thought content normal.         Judgment: Judgment normal.

## 2024-08-21 ENCOUNTER — OFFICE VISIT (OUTPATIENT)
Dept: URGENT CARE | Facility: CLINIC | Age: 24
End: 2024-08-21
Payer: MEDICARE

## 2024-08-21 VITALS
SYSTOLIC BLOOD PRESSURE: 112 MMHG | RESPIRATION RATE: 18 BRPM | HEART RATE: 72 BPM | OXYGEN SATURATION: 98 % | TEMPERATURE: 98.1 F | DIASTOLIC BLOOD PRESSURE: 66 MMHG

## 2024-08-21 DIAGNOSIS — R05.1 ACUTE COUGH: ICD-10-CM

## 2024-08-21 DIAGNOSIS — J06.9 VIRAL URI: Primary | ICD-10-CM

## 2024-08-21 LAB
SARS-COV-2 AG UPPER RESP QL IA: NEGATIVE
VALID CONTROL: NORMAL

## 2024-08-21 PROCEDURE — 87811 SARS-COV-2 COVID19 W/OPTIC: CPT | Performed by: PHYSICIAN ASSISTANT

## 2024-08-21 PROCEDURE — 99213 OFFICE O/P EST LOW 20 MIN: CPT | Performed by: PHYSICIAN ASSISTANT

## 2024-08-21 NOTE — LETTER
August 21, 2024     Patient: Deandre Duke   YOB: 2000   Date of Visit: 8/21/2024       To Whom It May Concern:    It is my medical opinion that Deandre Duke may return to work on 8/24/24.    If you have any questions or concerns, please don't hesitate to call.         Sincerely,        Michelle Behler, PA-C    CC: No Recipients

## 2024-08-21 NOTE — PROGRESS NOTES
[facility]    NAME: Deandre Duke is a 23 y.o. female  : 2000    MRN: [id number]  DATE: 2024  TIME: 1:03 PM    Assessment and Plan   Viral URI [J06.9]  1. Viral URI        2. Acute cough  Poct Covid 19 Rapid Antigen Test          Patient Instructions     Patient Instructions   Infection appears viral.  Recommend symptomatic treatment.    Ibuprofen or tylenol as needed for pain or fever.    Over the counter cough and cold medications to help with symptoms, such as mucinex, dayquil, nyquil.    Flonase for nasal congestion.  Consider neti-pot for nasal congestion.  Use salt water gargles for sore throat and throat lozenges.    Cough drops as needed.    Wash hands frequently to prevent the spread of infection.    Vitamin D3 2000 IU daily  Vitamin C 1g every 12 hours  Multivitamin daily  Fluids and rest  Avoid being around others until you no longer have a fever for 24 hours without the use of fever reducing medication.  Fevers can last 4-5 days.  Please see your pcp or go to the ER if your fever lasts longer than that.  If not improving over the next 7-10 days, follow up with PCP.  Symptoms may persist for 10-14 days.  A post-viral cough can last for a few months.  Symptoms will likely peak between days 4-6 of illness.     If you have worsening symptoms after a week of being sick, you should see your healthcare provider again to make sure that you do not have a secondary infection.    Go to the emergency room with any worsening symptoms.              Chief Complaint     Chief Complaint   Patient presents with    Cold Like Symptoms     Concerned over Covid, coughing, sneezing runny nose, body aches, x 3 days        History of Present Illness   23-year-old female here with complaint of cold symptoms, runny and stuffy nose.  Slight cough.  Bodyaches.  Symptoms started about 3 days ago.  No fever.  States that she also had a sore throat in the beginning.  That seems to be getting  better.        Review of Systems   Review of Systems   Constitutional:  Positive for fatigue. Negative for appetite change, chills and fever.   HENT:  Positive for congestion, postnasal drip, rhinorrhea, sneezing and sore throat. Negative for ear discharge, ear pain, facial swelling and sinus pressure.    Respiratory:  Positive for cough. Negative for shortness of breath and wheezing.    Musculoskeletal:  Positive for myalgias.   Neurological:  Positive for headaches.       Current Medications     Current Outpatient Medications:     etonogestrel-ethinyl estradiol (NuvaRing) 0.12-0.015 MG/24HR vaginal ring, INSERT 1 RING VAGINALLY AS DIRECTED. REMOVE AFTER 3 WEEKS & WAIT 7 DAYS BEFORE INSERTING A NEW RING, Disp: 1 each, Rfl: 0    cephalexin (KEFLEX) 500 mg capsule, Take 500 mg by mouth every 8 (eight) hours (Patient not taking: Reported on 8/15/2024), Disp: , Rfl:     chlorhexidine (PERIDEX) 0.12 % solution, Apply 15 mL to the mouth or throat 2 (two) times a day (Patient not taking: Reported on 7/18/2024), Disp: 120 mL, Rfl: 0    miSOPROStol (Cytotec) 200 mcg tablet, Please start medication vaginally patient can take Motrin 600 mg 1 hour prior to taking the medication (Patient not taking: Reported on 8/15/2024), Disp: 4 tablet, Rfl: 0    ondansetron (Zofran ODT) 4 mg disintegrating tablet, Take 1 tablet (4 mg total) by mouth every 6 (six) hours as needed for nausea or vomiting (Patient not taking: Reported on 10/20/2023), Disp: 20 tablet, Rfl: 0    Current Allergies     Allergies as of 08/21/2024 - Reviewed 08/21/2024   Allergen Reaction Noted    Kiwi extract - food allergy Shortness Of Breath and Facial Swelling 03/04/2021          The following portions of the patient's history were reviewed and updated as appropriate: allergies, current medications, past family history, past medical history, past social history, past surgical history and problem list.   Past Medical History:   Diagnosis Date    Chlamydia     2021,  2023    Gonorrhea     2023     Past Surgical History:   Procedure Laterality Date    NO PAST SURGERIES       Family History   Problem Relation Age of Onset    No Known Problems Mother     No Known Problems Father     No Known Problems Sister     No Known Problems Brother     No Known Problems Sister     No Known Problems Brother     Breast cancer Neg Hx     Colon cancer Neg Hx     Ovarian cancer Neg Hx     Cancer Neg Hx      Social History     Socioeconomic History    Marital status: Single     Spouse name: Not on file    Number of children: Not on file    Years of education: Not on file    Highest education level: Not on file   Occupational History    Not on file   Tobacco Use    Smoking status: Every Day     Types: E-Cigarettes    Smokeless tobacco: Never   Vaping Use    Vaping status: Every Day    Substances: Nicotine   Substance and Sexual Activity    Alcohol use: Yes     Comment: couple times/month    Drug use: Not Currently     Types: Marijuana     Comment: occ    Sexual activity: Not Currently     Partners: Male   Other Topics Concern    Not on file   Social History Narrative    Not on file     Social Determinants of Health     Financial Resource Strain: Low Risk  (10/20/2023)    Overall Financial Resource Strain (CARDIA)     Difficulty of Paying Living Expenses: Not hard at all   Food Insecurity: No Food Insecurity (10/20/2023)    Hunger Vital Sign     Worried About Running Out of Food in the Last Year: Never true     Ran Out of Food in the Last Year: Never true   Transportation Needs: No Transportation Needs (10/20/2023)    PRAPARE - Transportation     Lack of Transportation (Medical): No     Lack of Transportation (Non-Medical): No   Physical Activity: Not on file   Stress: Not on file   Social Connections: Not on file   Intimate Partner Violence: Not on file   Housing Stability: Low Risk  (3/9/2022)    Housing Stability Vital Sign     Unable to Pay for Housing in the Last Year: No     Number of Places  Lived in the Last Year: 1     Unstable Housing in the Last Year: No     Medications have been verified.    Objective   /66   Pulse 72   Temp 98.1 °F (36.7 °C)   Resp 18   LMP 05/03/2024 (Exact Date)   SpO2 98%      Physical Exam   Physical Exam  Vitals and nursing note reviewed.   Constitutional:       General: She is not in acute distress.     Appearance: She is well-developed.   HENT:      Head: Normocephalic and atraumatic.      Right Ear: Tympanic membrane normal.      Left Ear: Tympanic membrane normal.      Nose: Mucosal edema and rhinorrhea present.      Right Sinus: No maxillary sinus tenderness or frontal sinus tenderness.      Left Sinus: No maxillary sinus tenderness or frontal sinus tenderness.      Mouth/Throat:      Pharynx: Posterior oropharyngeal erythema present. No oropharyngeal exudate or posterior oropharyngeal edema.   Eyes:      Conjunctiva/sclera: Conjunctivae normal.   Cardiovascular:      Rate and Rhythm: Normal rate and regular rhythm.      Heart sounds: Normal heart sounds. No murmur heard.  Pulmonary:      Effort: Pulmonary effort is normal. No respiratory distress.      Breath sounds: Normal breath sounds.

## 2024-08-22 ENCOUNTER — HOSPITAL ENCOUNTER (EMERGENCY)
Facility: HOSPITAL | Age: 24
Discharge: HOME | End: 2024-08-22
Payer: MEDICARE

## 2024-08-22 ENCOUNTER — TELEPHONE (OUTPATIENT)
Age: 24
End: 2024-08-22

## 2024-08-22 ENCOUNTER — APPOINTMENT (EMERGENCY)
Dept: CT IMAGING | Facility: HOSPITAL | Age: 24
End: 2024-08-22
Payer: MEDICARE

## 2024-08-22 VITALS
OXYGEN SATURATION: 100 % | HEART RATE: 84 BPM | HEIGHT: 62 IN | RESPIRATION RATE: 16 BRPM | DIASTOLIC BLOOD PRESSURE: 69 MMHG | TEMPERATURE: 98.9 F | WEIGHT: 142.86 LBS | BODY MASS INDEX: 26.29 KG/M2 | SYSTOLIC BLOOD PRESSURE: 119 MMHG

## 2024-08-22 DIAGNOSIS — S01.01XA SCALP LACERATION, INITIAL ENCOUNTER: ICD-10-CM

## 2024-08-22 DIAGNOSIS — S09.90XA CHI (CLOSED HEAD INJURY): Primary | ICD-10-CM

## 2024-08-22 LAB
LAB AP GYN PRIMARY INTERPRETATION: NORMAL
Lab: NORMAL
PATH INTERP SPEC-IMP: NORMAL

## 2024-08-22 PROCEDURE — 99284 EMERGENCY DEPT VISIT MOD MDM: CPT

## 2024-08-22 PROCEDURE — 70450 CT HEAD/BRAIN W/O DYE: CPT

## 2024-08-22 PROCEDURE — 99283 EMERGENCY DEPT VISIT LOW MDM: CPT

## 2024-08-22 RX ORDER — GINSENG 100 MG
1 CAPSULE ORAL ONCE
Status: COMPLETED | OUTPATIENT
Start: 2024-08-22 | End: 2024-08-22

## 2024-08-22 RX ADMIN — BACITRACIN 1 SMALL APPLICATION: 500 OINTMENT TOPICAL at 04:36

## 2024-08-22 NOTE — TELEPHONE ENCOUNTER
Patient denies symptoms of a yeast infection. Discussed if has itching, irritation or white clumpy discharge she can use OTC Monistat. Reminded her to get her HCG level done and keep appt for Oct

## 2024-08-22 NOTE — Clinical Note
Deandre Duke was seen and treated in our emergency department on 8/22/2024.                Diagnosis:     Deandre  is off the rest of the shift today.    She may return on this date:          If you have any questions or concerns, please don't hesitate to call.      Jun Martinez MD    ______________________________           _______________          _______________  Hospital Representative                              Date                                Time

## 2024-08-22 NOTE — DISCHARGE INSTRUCTIONS
Continue to care for your abrasion with triple antibiotic ointment and dressing change once daily.  Continue to shower and bathe normally.  Be very careful of the glass in your hair when you get home.

## 2024-08-22 NOTE — ED PROVIDER NOTES
History  Chief Complaint   Patient presents with    Head Laceration     Pt states she slipped and fell in the bathroom and hit head on sink, denies LOC and no thinners     This is a 23-year-old female who is presenting after a fall in her bathroom left her with a forehead laceration.  Patient reports that she was in the bathroom this morning, when she slipped on a wet floor causing her to slide forward into the sink and then onto the bathroom floor.  She arrives with a blood coming from her forehead from multiple abrasions/small lacerations to her forehead.  There is also broken glass on her forehead and in her hairline.  Patient does not recall where the glass may have come from.  Patient does not believe that she lost consciousness but is unable to say for sure.  She did state that she had 1 drink of alcohol last night but did not believe that she was intoxicated.  She denies any new neurologic symptoms today including any headache or dizziness.  She is not on any anticoagulants or antiplatelet agents and has no known bleeding disorders.        Prior to Admission Medications   Prescriptions Last Dose Informant Patient Reported? Taking?   cephalexin (KEFLEX) 500 mg capsule   Yes No   Sig: Take 500 mg by mouth every 8 (eight) hours   Patient not taking: Reported on 8/15/2024   chlorhexidine (PERIDEX) 0.12 % solution   No No   Sig: Apply 15 mL to the mouth or throat 2 (two) times a day   Patient not taking: Reported on 7/18/2024   etonogestrel-ethinyl estradiol (NuvaRing) 0.12-0.015 MG/24HR vaginal ring   No No   Sig: INSERT 1 RING VAGINALLY AS DIRECTED. REMOVE AFTER 3 WEEKS & WAIT 7 DAYS BEFORE INSERTING A NEW RING   miSOPROStol (Cytotec) 200 mcg tablet   No No   Sig: Please start medication vaginally patient can take Motrin 600 mg 1 hour prior to taking the medication   Patient not taking: Reported on 8/15/2024   ondansetron (Zofran ODT) 4 mg disintegrating tablet   No No   Sig: Take 1 tablet (4 mg total) by mouth  every 6 (six) hours as needed for nausea or vomiting   Patient not taking: Reported on 10/20/2023      Facility-Administered Medications: None       Past Medical History:   Diagnosis Date    Chlamydia     2021, 2023    Gonorrhea     2023       Past Surgical History:   Procedure Laterality Date    NO PAST SURGERIES         Family History   Problem Relation Age of Onset    No Known Problems Mother     No Known Problems Father     No Known Problems Sister     No Known Problems Brother     No Known Problems Sister     No Known Problems Brother     Breast cancer Neg Hx     Colon cancer Neg Hx     Ovarian cancer Neg Hx     Cancer Neg Hx      I have reviewed and agree with the history as documented.    E-Cigarette/Vaping    E-Cigarette Use Current Every Day User     Comments vapes      E-Cigarette/Vaping Substances    Nicotine Yes      Social History     Tobacco Use    Smoking status: Every Day     Types: E-Cigarettes    Smokeless tobacco: Never   Vaping Use    Vaping status: Every Day    Substances: Nicotine   Substance Use Topics    Alcohol use: Yes     Comment: couple times/month    Drug use: Not Currently     Types: Marijuana     Comment: occ       Review of Systems   Constitutional:  Negative for chills and fever.   HENT:  Negative for ear pain and sore throat.    Eyes:  Negative for pain and visual disturbance.   Respiratory:  Negative for cough and shortness of breath.    Cardiovascular:  Negative for chest pain and palpitations.   Gastrointestinal:  Negative for abdominal pain and vomiting.   Genitourinary:  Negative for dysuria and hematuria.   Musculoskeletal:  Negative for arthralgias and back pain.   Skin:  Positive for wound. Negative for color change and rash.   Neurological:  Negative for seizures and syncope.   All other systems reviewed and are negative.      Physical Exam  Physical Exam  Vitals and nursing note reviewed.   Constitutional:       General: She is not in acute distress.     Appearance: She is  well-developed.   HENT:      Head: Normocephalic. Abrasion present.        Comments: Multiple abrasions across forehead, at least 17 pieces of small fragments of glass were pulled out of the patient's forehead.  Minimal venous ooze noted after extracting foreign body pieces of glass.     Right Ear: External ear normal.      Left Ear: External ear normal.      Nose: Nose normal. No congestion or rhinorrhea.      Mouth/Throat:      Mouth: Mucous membranes are moist.      Pharynx: Oropharynx is clear. No oropharyngeal exudate or posterior oropharyngeal erythema.   Eyes:      General: No scleral icterus.     Extraocular Movements: Extraocular movements intact.      Conjunctiva/sclera: Conjunctivae normal.      Pupils: Pupils are equal, round, and reactive to light.   Cardiovascular:      Rate and Rhythm: Normal rate and regular rhythm.      Pulses: Normal pulses.      Heart sounds: Normal heart sounds. No murmur heard.  Pulmonary:      Effort: Pulmonary effort is normal. No respiratory distress.      Breath sounds: Normal breath sounds. No wheezing or rhonchi.   Abdominal:      General: Abdomen is flat. There is no distension.      Palpations: Abdomen is soft.      Tenderness: There is no abdominal tenderness. There is no guarding.   Musculoskeletal:         General: No swelling.      Cervical back: Neck supple. No rigidity.      Right lower leg: No edema.      Left lower leg: No edema.   Lymphadenopathy:      Cervical: No cervical adenopathy.   Skin:     General: Skin is warm and dry.      Capillary Refill: Capillary refill takes less than 2 seconds.      Coloration: Skin is not jaundiced.      Findings: No rash.   Neurological:      General: No focal deficit present.      Mental Status: She is alert and oriented to person, place, and time. Mental status is at baseline.   Psychiatric:         Mood and Affect: Mood normal.         Behavior: Behavior normal.         Vital Signs  ED Triage Vitals [08/22/24 0560]    Temperature Pulse Respirations Blood Pressure SpO2   98.9 °F (37.2 °C) 84 16 119/69 100 %      Temp Source Heart Rate Source Patient Position - Orthostatic VS BP Location FiO2 (%)   Temporal Monitor Sitting Left arm --      Pain Score       3           Vitals:    08/22/24 0336   BP: 119/69   Pulse: 84   Patient Position - Orthostatic VS: Sitting         Visual Acuity  Visual Acuity      Flowsheet Row Most Recent Value   L Pupil Size (mm) 2   R Pupil Size (mm) 2            ED Medications  Medications   bacitracin topical ointment 1 small application (1 small application Topical Given 8/22/24 0436)       Diagnostic Studies  Results Reviewed       None                   CT head wo contrast   Final Result by Dereje Alexis MD (08/22 0544)      No acute intracranial abnormality. Mild anterior frontal scalp soft tissue swelling.                  Workstation performed: [id number]                    Procedures  Procedures         ED Course                                 SBIRT 20yo+      Flowsheet Row Most Recent Value   Initial Alcohol Screen: US AUDIT-C     1. How often do you have a drink containing alcohol? 0 Filed at: 08/22/2024 0335   2. How many drinks containing alcohol do you have on a typical day you are drinking?  0 Filed at: 08/22/2024 0335   3b. FEMALE Any Age, or MALE 65+: How often do you have 4 or more drinks on one occassion? 0 Filed at: 08/22/2024 0335   Audit-C Score 0 Filed at: 08/22/2024 0335   MARCELO: How many times in the past year have you...    Used an illegal drug or used a prescription medication for non-medical reasons? Never Filed at: 08/22/2024 0335                      Medical Decision Making  Medical complexity: 23-year-old female with laceration/abrasion to her forehead with multiple pieces of embedded glass.  Will obtain CT scan only to evaluate for possible intracranial abnormality/trauma as patient does not seem to recall the events surrounding the fall very well, but will also evaluate  for large pieces of embedded foreign body in the soft tissue of the scalp.  I do not see any large lacerations that will need repair, however there were multiple small lacerations that will need to be explored for possible foreign body/glass.  Patient is up-to-date on tetanus vaccine as of 2021.  Will need antibiotic ointment for dressings of her abrasions on her forehead.    Reassessment/disposition: Thankfully, CT scan demonstrated no acute traumatic injuries other than some soft tissue swelling and small piece of glass embedded in the forehead which I removed.  I did remove multiple pieces of other glass prior to CT scan.  There were multiple very small lacerations which did not require any laceration repair other than cleaning.  Additionally, I wrapped the wound with gauze roll after applying bacitracin ointment.  The patient was told how to care for the wound at home.  She was also told the signs and symptoms of concussion to look out for at home and to avoid contact sports for at least the next 2 weeks.  Patient stressed understanding of these return precautions and also agrees to come back to the emergency department if she were to develop any new neurologic symptoms.  She was discharged with stable examination, normal vital signs, ambulatory at her baseline, tolerating p.o., without any episodes of vomiting and a normal CT scan.    Amount and/or Complexity of Data Reviewed  Radiology: ordered.    Risk  OTC drugs.                 Disposition  Final diagnoses:   CHI (closed head injury)   Scalp laceration, initial encounter     Time reflects when diagnosis was documented in both MDM as applicable and the Disposition within this note       Time User Action Codes Description Comment    8/22/2024  5:48 AM Jun Martinez Add [S09.90XA] CHI (closed head injury)     8/22/2024  5:48 AM Jun Martinez Add [S01.01XA] Scalp laceration, initial encounter           ED Disposition       ED Disposition   Discharge     Condition   Stable    Date/Time   Thu Aug 22, 2024 0548    Comment   Deandre Llamas Srikanth discharge to home/self care.                   Follow-up Information       Follow up With Specialties Details Why Contact Info Additional Information    Count includes the [facility] Emergency Department Emergency Medicine Go to  If symptoms worsen, As needed [address] [facility] [address]  [phone] Count includes the [facility] Emergency Department, [address], [place], [address]    SL InfoLink  Schedule an appointment as soon as possible for a visit  Call to find a [facility] PCP [phone]             Discharge Medication List as of 8/22/2024  5:49 AM        CONTINUE these medications which have NOT CHANGED    Details   cephalexin (KEFLEX) 500 mg capsule Take 500 mg by mouth every 8 (eight) hours, Historical Med      chlorhexidine (PERIDEX) 0.12 % solution Apply 15 mL to the mouth or throat 2 (two) times a day, Starting Sat 6/8/2024, Normal      etonogestrel-ethinyl estradiol (NuvaRing) 0.12-0.015 MG/24HR vaginal ring INSERT 1 RING VAGINALLY AS DIRECTED. REMOVE AFTER 3 WEEKS & WAIT 7 DAYS BEFORE INSERTING A NEW RING, Normal      miSOPROStol (Cytotec) 200 mcg tablet Please start medication vaginally patient can take Motrin 600 mg 1 hour prior to taking the medication, Normal      ondansetron (Zofran ODT) 4 mg disintegrating tablet Take 1 tablet (4 mg total) by mouth every 6 (six) hours as needed for nausea or vomiting, Starting Thu 6/29/2023, Normal             No discharge procedures on file.    PDMP Review       None            ED Provider  Electronically Signed by             Jun Martinez MD  08/22/24 0607

## 2024-08-22 NOTE — TELEPHONE ENCOUNTER
----- Message from Maria De Jesus Campos PA-C sent at 8/22/2024  4:23 PM EDT -----  Staff, please call patient.  Please let her know that Pap smear that her PA Shanti performed at her annual exam was negative.  However it did show some presence of some fungal organisms consistent with yeast.  I am not sure if she was having any symptoms of thicker white discharge or itching at the time.  However she can use a 3-day course of Monistat vaginal suppository cream OTC.    Please remind patient of importance to complete her follow-up hCG blood work ordered by Shanti to confirm levels are back to normal after the miscarriage.  Also please remind patient of importance of follow-up appointment in October for test of cure chlamydia.  Thank you

## 2024-09-11 ENCOUNTER — OFFICE VISIT (OUTPATIENT)
Dept: OBGYN CLINIC | Facility: CLINIC | Age: 24
End: 2024-09-11
Payer: MEDICARE

## 2024-09-11 VITALS
SYSTOLIC BLOOD PRESSURE: 118 MMHG | WEIGHT: 138.2 LBS | HEIGHT: 62 IN | DIASTOLIC BLOOD PRESSURE: 76 MMHG | BODY MASS INDEX: 25.43 KG/M2

## 2024-09-11 DIAGNOSIS — A74.9 CHLAMYDIA INFECTION: Primary | ICD-10-CM

## 2024-09-11 PROCEDURE — 87491 CHLMYD TRACH DNA AMP PROBE: CPT | Performed by: PHYSICIAN ASSISTANT

## 2024-09-11 PROCEDURE — 99213 OFFICE O/P EST LOW 20 MIN: CPT | Performed by: PHYSICIAN ASSISTANT

## 2024-09-11 PROCEDURE — 87591 N.GONORRHOEAE DNA AMP PROB: CPT | Performed by: PHYSICIAN ASSISTANT

## 2024-09-11 NOTE — PROGRESS NOTES
"Assessment/Plan:      Diagnoses and all orders for this visit:    Chlamydia infection  -     Chlamydia/GC amplified DNA by PCR        GC/chlamydia testing done.  We will call with results.  F/u in August 2025 for yearly gyn exam.  Call with problems in the interim.    Subjective:     Patient ID: Deandre Duke is a 23 y.o. female.    Patient is here for test of cure for chlamydial infection.  Was treated on 7/19 and finished course of abx.  Her partner was treated as well; has not had intercourse since.  Denies urinary symptoms, vaginal discharge, odor, itching, burning, pelvic pain, abdominal pain, n/v, and fever/chills.        Review of Systems   Constitutional:  Negative for chills and fever.   Gastrointestinal:  Negative for abdominal distention, abdominal pain, nausea and vomiting.   Genitourinary:  Negative for difficulty urinating, dysuria, frequency, genital sores, hematuria, menstrual problem, pelvic pain, urgency, vaginal bleeding, vaginal discharge and vaginal pain.         Objective:  Visit Vitals  /76 (BP Location: Left arm, Patient Position: Sitting, Cuff Size: Adult)   Ht 5' 2\" (1.575 m)   Wt 62.7 kg (138 lb 3.2 oz)   LMP 08/31/2024 (Exact Date)   BMI 25.28 kg/m²   OB Status Having periods   Smoking Status Every Day   BSA 1.63 m²         Physical Exam  Vitals reviewed. Exam conducted with a chaperone present.   Constitutional:       Appearance: Normal appearance. She is well-developed and normal weight.   Genitourinary:     General: Normal vulva.      Pubic Area: No rash.       Labia:         Right: No rash, tenderness, lesion or injury.         Left: No rash, tenderness, lesion or injury.       Vagina: No vaginal discharge, erythema, tenderness or bleeding.      Cervix: Normal.      Uterus: Normal.       Adnexa: Right adnexa normal and left adnexa normal.        Right: No mass, tenderness or fullness.          Left: No mass, tenderness or fullness.     Lymphadenopathy:      Lower Body: No " right inguinal adenopathy. No left inguinal adenopathy.   Skin:     General: Skin is warm and dry.   Neurological:      Mental Status: She is alert and oriented to person, place, and time.   Psychiatric:         Mood and Affect: Mood normal.         Behavior: Behavior normal. Behavior is cooperative.         Thought Content: Thought content normal.         Judgment: Judgment normal.

## 2024-09-13 LAB
C TRACH DNA SPEC QL NAA+PROBE: NEGATIVE
N GONORRHOEA DNA SPEC QL NAA+PROBE: NEGATIVE

## 2024-11-06 DIAGNOSIS — Z30.09 UNWANTED FERTILITY: ICD-10-CM

## 2024-11-06 NOTE — TELEPHONE ENCOUNTER
Medication: etonogestrel-ethinyl estradiol (NuvaRing) 0.12-0.015 MG/24HR vaginal ring     Dose/Frequency:  INSERT 1 RING VAGINALLY AS DIRECTED     Quantity: 1    Pharmacy: [facility]/pharmacy #[id number] - [place], [place] [address]     Office:   [] PCP/Provider -   [x] Speciality/Provider -     Does the patient have enough for 3 days?   [] Yes   [x] No - Send as HP to POD

## 2024-11-08 NOTE — TELEPHONE ENCOUNTER
Patient has been seeing [facility] for this prescription.    Well appearing, well nourished obese , awake, alert, oriented to person, place, time/situation and in no apparent distress. normal...

## 2024-11-13 RX ORDER — ETONOGESTREL AND ETHINYL ESTRADIOL VAGINAL RING .015; .12 MG/D; MG/D
RING VAGINAL
Qty: 1 EACH | Refills: 9 | Status: SHIPPED | OUTPATIENT
Start: 2024-11-13

## 2024-12-16 ENCOUNTER — OFFICE VISIT (OUTPATIENT)
Dept: OBGYN CLINIC | Facility: CLINIC | Age: 24
End: 2024-12-16
Payer: MEDICARE

## 2024-12-16 VITALS
WEIGHT: 139.8 LBS | SYSTOLIC BLOOD PRESSURE: 120 MMHG | BODY MASS INDEX: 25.73 KG/M2 | DIASTOLIC BLOOD PRESSURE: 76 MMHG | HEIGHT: 62 IN

## 2024-12-16 DIAGNOSIS — Z11.3 SCREEN FOR STD (SEXUALLY TRANSMITTED DISEASE): Primary | ICD-10-CM

## 2024-12-16 PROCEDURE — 87491 CHLMYD TRACH DNA AMP PROBE: CPT | Performed by: PHYSICIAN ASSISTANT

## 2024-12-16 PROCEDURE — 87591 N.GONORRHOEAE DNA AMP PROB: CPT | Performed by: PHYSICIAN ASSISTANT

## 2024-12-16 PROCEDURE — 99213 OFFICE O/P EST LOW 20 MIN: CPT | Performed by: PHYSICIAN ASSISTANT

## 2024-12-16 NOTE — PROGRESS NOTES
"Assessment/Plan:      Diagnoses and all orders for this visit:    Screen for STD (sexually transmitted disease)  -     Chlamydia/GC amplified DNA by PCR        GC/chlamydia screening done; we will call with results.  Stressed consistent condom use for STD prevention.  F/u in August for yearly gyn exam.  Call with problems in the interim.    Subjective:     Patient ID: Deandre Duke is a 24 y.o. female.    Patient is here for STD screening.  States she had unprotected intercourse in September and would like screening.  Denies urinary symptoms, vaginal discharge, odor, itching, burning, pelvic pain, abdominal pain, n/v, and fever/chills.        Review of Systems   Constitutional:  Negative for chills and fever.   Gastrointestinal:  Negative for abdominal distention, abdominal pain, nausea and vomiting.   Genitourinary:  Negative for difficulty urinating, dysuria, frequency, genital sores, hematuria, menstrual problem, pelvic pain, urgency, vaginal bleeding, vaginal discharge and vaginal pain.         Objective:  Visit Vitals  /76 (BP Location: Left arm, Patient Position: Sitting, Cuff Size: Adult)   Ht 5' 2\" (1.575 m)   Wt 63.4 kg (139 lb 12.8 oz)   LMP 12/08/2024 (Exact Date)   BMI 25.57 kg/m²   OB Status Having periods   Smoking Status Every Day   BSA 1.64 m²         Physical Exam  Vitals reviewed. Exam conducted with a chaperone present.   Constitutional:       Appearance: Normal appearance. She is well-developed and normal weight.   Genitourinary:     General: Normal vulva.      Pubic Area: No rash.       Labia:         Right: No rash, tenderness, lesion or injury.         Left: No rash, tenderness, lesion or injury.       Vagina: Normal. No vaginal discharge, erythema, tenderness or bleeding.      Cervix: Normal.      Uterus: Normal.       Adnexa: Right adnexa normal and left adnexa normal.        Right: No mass, tenderness or fullness.          Left: No mass, tenderness or fullness.   "   Lymphadenopathy:      Lower Body: No right inguinal adenopathy. No left inguinal adenopathy.   Skin:     General: Skin is warm and dry.   Neurological:      Mental Status: She is alert and oriented to person, place, and time.   Psychiatric:         Mood and Affect: Mood normal.         Behavior: Behavior normal. Behavior is cooperative.         Thought Content: Thought content normal.         Judgment: Judgment normal.

## 2024-12-17 LAB
C TRACH DNA SPEC QL NAA+PROBE: NEGATIVE
N GONORRHOEA DNA SPEC QL NAA+PROBE: NEGATIVE

## 2024-12-18 ENCOUNTER — RESULTS FOLLOW-UP (OUTPATIENT)
Dept: LABOR AND DELIVERY | Facility: HOSPITAL | Age: 24
End: 2024-12-18

## 2025-02-24 ENCOUNTER — NURSE TRIAGE (OUTPATIENT)
Age: 25
End: 2025-02-24

## 2025-02-24 NOTE — TELEPHONE ENCOUNTER
"Spoke with patient who reports for the last few days she has noticed light green vaginal discharge and odor.  She denies any pain or itching.  She would like an appt.  Appt made for Wed.  No further questions or concerns at this time.     Reason for Disposition   Bad smelling vaginal discharge    Answer Assessment - Initial Assessment Questions  1. DISCHARGE: \"Describe the discharge.\" (e.g., white, yellow, green, gray, foamy, cottage cheese-like)      Light green  2. ODOR: \"Is there a bad odor?\"      yes  3. ONSET: \"When did the discharge begin?\"      Few days ago  4. RASH: \"Is there a rash in the genital area?\" If Yes, ask: \"Describe it.\" (e.g., redness, blisters, sores, bumps)      denies  5. ABDOMEN PAIN: \"Are you having any abdomen pain?\" If Yes, ask: \"What does it feel like? \" (e.g., crampy, dull, intermittent, constant)       denies  7. CAUSE: \"What do you think is causing the discharge?\" \"Have you had the same problem before?\" \"What happened then?\"      BV  8. OTHER SYMPTOMS: \"Do you have any other symptoms?\" (e.g., fever, itching, vaginal bleeding, pain with urination, injury to genital area, vaginal foreign body)      Denies  9. PREGNANCY: \"Is there any chance you are pregnant?\" \"When was your last menstrual period?\"      1/11    Protocols used: Vaginal Discharge-Adult-[place]    "

## 2025-04-16 ENCOUNTER — OFFICE VISIT (OUTPATIENT)
Dept: FAMILY MEDICINE CLINIC | Facility: CLINIC | Age: 25
End: 2025-04-16
Payer: MEDICARE

## 2025-04-16 VITALS
HEART RATE: 93 BPM | WEIGHT: 141.6 LBS | SYSTOLIC BLOOD PRESSURE: 108 MMHG | RESPIRATION RATE: 14 BRPM | DIASTOLIC BLOOD PRESSURE: 62 MMHG | TEMPERATURE: 96.8 F | HEIGHT: 65 IN | BODY MASS INDEX: 23.59 KG/M2 | OXYGEN SATURATION: 98 %

## 2025-04-16 DIAGNOSIS — Z13.6 SCREENING FOR CARDIOVASCULAR CONDITION: ICD-10-CM

## 2025-04-16 DIAGNOSIS — Z00.00 ANNUAL PHYSICAL EXAM: Primary | ICD-10-CM

## 2025-04-16 DIAGNOSIS — Z13.29 THYROID DISORDER SCREENING: ICD-10-CM

## 2025-04-16 DIAGNOSIS — Z13.1 SCREENING FOR DIABETES MELLITUS: ICD-10-CM

## 2025-04-16 DIAGNOSIS — Z11.3 SCREENING EXAMINATION FOR STD (SEXUALLY TRANSMITTED DISEASE): ICD-10-CM

## 2025-04-16 DIAGNOSIS — Z11.4 ENCOUNTER FOR SCREENING FOR HIV: ICD-10-CM

## 2025-04-16 DIAGNOSIS — Z02.4 DRIVER'S PERMIT PHYSICAL EXAMINATION: ICD-10-CM

## 2025-04-16 DIAGNOSIS — Z11.59 ENCOUNTER FOR HEPATITIS C SCREENING TEST FOR LOW RISK PATIENT: ICD-10-CM

## 2025-04-16 DIAGNOSIS — Z11.59 NEED FOR HEPATITIS B SCREENING TEST: ICD-10-CM

## 2025-04-16 PROCEDURE — 99385 PREV VISIT NEW AGE 18-39: CPT | Performed by: FAMILY MEDICINE

## 2025-04-16 NOTE — PROGRESS NOTES
Adult Annual Physical  Name: Deandre Llamas Srikanth      : 2000      MRN: [id number]  Encounter Provider: Crissy Cota MD  Encounter Date: 2025   Encounter department: [facility] MEDICINE    :  Assessment & Plan  Annual physical exam    Orders:    CBC and differential; Future    Comprehensive metabolic panel; Future    Hemoglobin A1C; Future    Lipid panel; Future    TSH, 3rd generation with Free T4 reflex; Future    Screening for diabetes mellitus    Orders:    Hemoglobin A1C; Future    Screening for cardiovascular condition    Orders:    Lipid panel; Future    Thyroid disorder screening    Orders:    TSH, 3rd generation with Free T4 reflex; Future    Encounter for screening for HIV    Orders:    HIV 1/2 AG/AB w Reflex [facility] for 2 yr old and above; Future    Encounter for hepatitis C screening test for low risk patient    Orders:    Hepatitis C antibody; Future    Need for hepatitis B screening test    Orders:    Hepatitis B surface antigen; Future    Screening examination for STD (sexually transmitted disease)    Orders:    RPR (DX) W/REFL TITER AND CONFIRM TESTING (REFL); Future    Chlamydia/GC amplified DNA by PCR; Future    's permit physical examination  Well adult exam with no major concerns. Drivers physical form filled out. No other vaccinations warranted at this time.  Patient understood and agreed. Return to follow up as needed     At this time no medical conditions which affect ability to operate a vehicle. Report back immediately if any new conditions or limitations develop. Discussed importance of seat belt safety for  and all passengers in the car. Discussed importance of patient’s knowledge of car seat and booster seat use when applicable. Do not use alcohol, drugs, or substances which inhibit your ability to operate a vehicle. Do not use cell phone or other devices which can distract you while operating a vehicle. Reviewed importance of familiarizing ones  self with the rules and regulations outlined in drivers manual.          Annual physical exam         Screening for diabetes mellitus         Screening for cardiovascular condition               Preventive Screenings:    - Cervical cancer screening: screening up-to-date     Immunizations:  - Immunizations due: Influenza and Prevnar 20         History of Present Illness     Adult Annual Physical:  Patient presents for annual physical. 24 year old female here for 's physical exam for automobile license and yearly PE.   No history of neurological disorders.  No neuropsychiatric disorders, no known prevous problems with circulatory system, no previous heart problem, no previous history of elevated blood pressure.  Patient denies history of seizures.  No personal history of diabetes.  No history of memory problems.  No history of drug or alcohol use/abuse.   No problems with hearing or visual problems.  No new complaints or concerns    She states that she never had a 's permit.          .     Diet and Physical Activity:  - Diet/Nutrition: well balanced diet and consuming 3-5 servings of fruits/vegetables daily.  - Exercise: moderate cardiovascular exercise.    Depression Screening:  - PHQ-2 Score: 0    General Health:  - Sleep: sleeps well.  - Hearing: normal hearing bilateral ears.  - Vision: goes for regular eye exams.  - Dental: regular dental visits.    /GYN Health:  - Follows with GYN: yes.   - Menopause: premenopausal.   - History of STDs: no    Review of Systems   Constitutional:  Negative for fatigue and fever.   HENT:  Negative for congestion, facial swelling, mouth sores, rhinorrhea, sore throat and trouble swallowing.    Eyes:  Negative for pain and redness.   Respiratory:  Negative for cough, shortness of breath and wheezing.    Cardiovascular:  Negative for chest pain, palpitations and leg swelling.   Gastrointestinal:  Negative for abdominal pain, blood in stool, constipation, diarrhea and  nausea.   Genitourinary:  Negative for dysuria, hematuria and urgency.   Musculoskeletal:  Negative for arthralgias, back pain and myalgias.   Skin:  Negative for rash and wound.   Neurological:  Negative for seizures, syncope and headaches.   Hematological:  Negative for adenopathy.   Psychiatric/Behavioral:  Negative for agitation and behavioral problems.      Medical History Reviewed by provider this encounter:  Tobacco  Allergies  Meds  Problems  Med Hx  Surg Hx  Fam Hx     .  Current Outpatient Medications on File Prior to Visit   Medication Sig Dispense Refill    etonogestrel-ethinyl estradiol (NuvaRing) 0.12-0.015 MG/24HR vaginal ring INSERT 1 RING VAGINALLY AS DIRECTED. REMOVE AFTER 3 WEEKS & WAIT 7 DAYS BEFORE INSERTING A NEW RING 1 each 9    [DISCONTINUED] cephalexin (KEFLEX) 500 mg capsule Take 500 mg by mouth every 8 (eight) hours (Patient not taking: Reported on 4/16/2025)      [DISCONTINUED] chlorhexidine (PERIDEX) 0.12 % solution Apply 15 mL to the mouth or throat 2 (two) times a day (Patient not taking: Reported on 4/16/2025) 120 mL 0    [DISCONTINUED] miSOPROStol (Cytotec) 200 mcg tablet Please start medication vaginally patient can take Motrin 600 mg 1 hour prior to taking the medication (Patient not taking: Reported on 4/16/2025) 4 tablet 0    [DISCONTINUED] ondansetron (Zofran ODT) 4 mg disintegrating tablet Take 1 tablet (4 mg total) by mouth every 6 (six) hours as needed for nausea or vomiting (Patient not taking: Reported on 4/16/2025) 20 tablet 0     No current facility-administered medications on file prior to visit.      Social History     Tobacco Use    Smoking status: Some Days     Types: E-Cigarettes    Smokeless tobacco: Never   Vaping Use    Vaping status: Every Day    Substances: Nicotine   Substance and Sexual Activity    Alcohol use: Yes     Comment: couple times/month    Drug use: Not Currently     Types: Marijuana     Comment: occ    Sexual activity: Not Currently      "Partners: Male     Birth control/protection: Ring       Objective   /62 (BP Location: Right arm, Patient Position: Sitting, Cuff Size: Adult)   Pulse 93   Temp (!) 96.8 °F (36 °C) (Tympanic)   Resp 14   Ht 5' 4.5\" (1.638 m)   Wt 64.2 kg (141 lb 9.6 oz)   LMP 03/16/2025   SpO2 98%   BMI 23.93 kg/m²     Physical Exam  Vitals and nursing note reviewed.   Constitutional:       Appearance: Normal appearance. She is well-developed.   HENT:      Head: Normocephalic and atraumatic.      Right Ear: Tympanic membrane, ear canal and external ear normal.      Left Ear: Tympanic membrane, ear canal and external ear normal.      Nose: Nose normal.      Mouth/Throat:      Pharynx: No oropharyngeal exudate.   Eyes:      General: No scleral icterus.        Right eye: No discharge.         Left eye: No discharge.      Conjunctiva/sclera: Conjunctivae normal.      Pupils: Pupils are equal, round, and reactive to light.   Neck:      Thyroid: No thyromegaly.   Cardiovascular:      Rate and Rhythm: Normal rate and regular rhythm.      Heart sounds: No murmur heard.     No gallop.   Pulmonary:      Effort: Pulmonary effort is normal. No respiratory distress.      Breath sounds: Normal breath sounds. No wheezing or rales.   Abdominal:      Palpations: Abdomen is soft.      Tenderness: There is no abdominal tenderness.   Musculoskeletal:         General: No tenderness or deformity.      Cervical back: Normal range of motion.      Right lower leg: No edema.      Left lower leg: No edema.   Lymphadenopathy:      Cervical: No cervical adenopathy.   Skin:     General: Skin is warm.      Capillary Refill: Capillary refill takes less than 2 seconds.      Findings: No erythema or rash.   Neurological:      Mental Status: She is alert and oriented to person, place, and time.      Deep Tendon Reflexes: Reflexes normal.   Psychiatric:         Behavior: Behavior normal.         Thought Content: Thought content normal.         Judgment: " Judgment normal.

## 2025-04-16 NOTE — PATIENT INSTRUCTIONS
"Patient Education     Routine physical for adults   The Basics   Written by the doctors and editors at [facility]   What is a physical? -- A physical is a routine visit, or \"check-up,\" with your doctor. You might also hear it called a \"wellness visit\" or \"preventive visit.\"  During each visit, the doctor will:   Ask about your physical and mental health   Ask about your habits, behaviors, and lifestyle   Do an exam   Give you vaccines if needed   Talk to you about any medicines you take   Give advice about your health   Answer your questions  Getting regular check-ups is an important part of taking care of your health. It can help your doctor find and treat any problems you have. But it's also important for preventing health problems.  A routine physical is different from a \"sick visit.\" A sick visit is when you see a doctor because of a health concern or problem. Since physicals are scheduled ahead of time, you can think about what you want to ask the doctor.  How often should I get a physical? -- It depends on your age and health. In general, for people age 21 years and older:   If you are younger than 50 years, you might be able to get a physical every 3 years.   If you are 50 years or older, your doctor might recommend a physical every year.  If you have an ongoing health condition, like diabetes or high blood pressure, your doctor will probably want to see you more often.  What happens during a physical? -- In general, each visit will include:   Physical exam - The doctor or nurse will check your height, weight, heart rate, and blood pressure. They will also look at your eyes and ears. They will ask about how you are feeling and whether you have any symptoms that bother you.   Medicines - It's a good idea to bring a list of all the medicines you take to each doctor visit. Your doctor will talk to you about your medicines and answer any questions. Tell them if you are having any side effects that bother you. You " "should also tell them if you are having trouble paying for any of your medicines.   Habits and behaviors - This includes:   Your diet   Your exercise habits   Whether you smoke, drink alcohol, or use drugs   Whether you are sexually active   Whether you feel safe at home  Your doctor will talk to you about things you can do to improve your health and lower your risk of health problems. They will also offer help and support. For example, if you want to quit smoking, they can give you advice and might prescribe medicines. If you want to improve your diet or get more physical activity, they can help you with this, too.   Lab tests, if needed - The tests you get will depend on your age and situation. For example, your doctor might want to check your:   Cholesterol   Blood sugar   Iron level   Vaccines - The recommended vaccines will depend on your age, health, and what vaccines you already had. Vaccines are very important because they can prevent certain serious or deadly infections.   Discussion of screening - \"Screening\" means checking for diseases or other health problems before they cause symptoms. Your doctor can recommend screening based on your age, risk, and preferences. This might include tests to check for:   Cancer, such as breast, prostate, cervical, ovarian, colorectal, prostate, lung, or skin cancer   Sexually transmitted infections, such as chlamydia and gonorrhea   Mental health conditions like depression and anxiety  Your doctor will talk to you about the different types of screening tests. They can help you decide which screenings to have. They can also explain what the results might mean.   Answering questions - The physical is a good time to ask the doctor or nurse questions about your health. If needed, they can refer you to other doctors or specialists, too.  Adults older than 65 years often need other care, too. As you get older, your doctor will talk to you about:   How to prevent falling at " home   Hearing or vision tests   Memory testing   How to take your medicines safely   Making sure that you have the help and support you need at home  All topics are updated as new evidence becomes available and our peer review process is complete.  This topic retrieved from [employer] on: May 02, 2024.  Topic 717225 Version 1.0  Release: 32.4.3 - C32.122  © 2024 UpToDate, Inc. and/or its affiliates. All rights reserved.  Consumer Information Use and Disclaimer   Disclaimer: This generalized information is a limited summary of diagnosis, treatment, and/or medication information. It is not meant to be comprehensive and should be used as a tool to help the user understand and/or assess potential diagnostic and treatment options. It does NOT include all information about conditions, treatments, medications, side effects, or risks that may apply to a specific patient. It is not intended to be medical advice or a substitute for the medical advice, diagnosis, or treatment of a health care provider based on the health care provider's examination and assessment of a patient's specific and unique circumstances. Patients must speak with a health care provider for complete information about their health, medical questions, and treatment options, including any risks or benefits regarding use of medications. This information does not endorse any treatments or medications as safe, effective, or approved for treating a specific patient. UpToDate, Inc. and its affiliates disclaim any warranty or liability relating to this information or the use thereof.The use of this information is governed by the Terms of Use, available at https://www.woltersTigerstripeuwer.com/en/know/clinical-effectiveness-terms. 2024© UpToDate, Inc. and its affiliates and/or licensors. All rights reserved.  Copyright   © 2024 UpToDate, Inc. and/or its affiliates. All rights reserved.

## 2025-07-16 DIAGNOSIS — Z30.09 UNWANTED FERTILITY: ICD-10-CM

## 2025-07-16 RX ORDER — ETONOGESTREL AND ETHINYL ESTRADIOL VAGINAL RING .015; .12 MG/D; MG/D
RING VAGINAL
Qty: 3 EACH | Refills: 5 | Status: SHIPPED | OUTPATIENT
Start: 2025-07-16 | End: 2025-07-21 | Stop reason: SDUPTHER

## 2025-07-16 NOTE — TELEPHONE ENCOUNTER
Reason for call:   [x] Refill   [] Prior Auth  [] Other:     Office:   [] PCP/Provider -   [x] Specialty/Provider - [facility]    Medication: etonogestrel-ethinyl estradiol (NuvaRing) 0.12-0.015 MG/24HR vaginal ring     Dose/Frequency: INSERT 1 RING VAGINALLY AS DIRECTED. REMOVE AFTER 3 WEEKS & WAIT 7 DAYS BEFORE INSERTING A NEW RING,     Quantity: 3    Pharmacy: [id number]    Local Pharmacy   Does the patient have enough for 3 days?   [x] Yes   [] No - Send as HP to POD    Mail Away Pharmacy   Does the patient have enough for 10 days?   [] Yes   [] No - Send as HP to POD

## 2025-07-17 ENCOUNTER — TELEPHONE (OUTPATIENT)
Dept: OBGYN CLINIC | Facility: CLINIC | Age: 25
End: 2025-07-17

## 2025-07-17 RX ORDER — ETONOGESTREL AND ETHINYL ESTRADIOL .12; .015 MG/D; MG/D
RING VAGINAL
Refills: 5 | OUTPATIENT
Start: 2025-07-17

## 2025-07-21 DIAGNOSIS — Z30.09 UNWANTED FERTILITY: ICD-10-CM

## 2025-07-21 RX ORDER — ETONOGESTREL/ETHINYL ESTRADIOL .12-.015MG
RING, VAGINAL VAGINAL
Qty: 3 EACH | Refills: 0 | Status: SHIPPED | OUTPATIENT
Start: 2025-07-21

## 2025-07-21 NOTE — TELEPHONE ENCOUNTER
Brand Name NUVARING  is the preferred alternative, Generic  is not covered. Please send rx to pharmacy as Brand Name NUVARING  please make sure the LULÚ box is checked off so rx when sending rx so it's sent correctly to pharmacy as Brand Name Only.